# Patient Record
Sex: FEMALE | Race: WHITE | NOT HISPANIC OR LATINO | Employment: OTHER | URBAN - NONMETROPOLITAN AREA
[De-identification: names, ages, dates, MRNs, and addresses within clinical notes are randomized per-mention and may not be internally consistent; named-entity substitution may affect disease eponyms.]

---

## 2020-07-13 ENCOUNTER — OFFICE VISIT (OUTPATIENT)
Dept: OTOLARYNGOLOGY | Facility: CLINIC | Age: 85
End: 2020-07-13

## 2020-07-13 ENCOUNTER — PROCEDURE VISIT (OUTPATIENT)
Dept: OTOLARYNGOLOGY | Facility: CLINIC | Age: 85
End: 2020-07-13

## 2020-07-13 VITALS
HEIGHT: 63 IN | DIASTOLIC BLOOD PRESSURE: 78 MMHG | WEIGHT: 179.6 LBS | HEART RATE: 110 BPM | BODY MASS INDEX: 31.82 KG/M2 | TEMPERATURE: 97.2 F | SYSTOLIC BLOOD PRESSURE: 135 MMHG

## 2020-07-13 DIAGNOSIS — H61.23 IMPACTED CERUMEN, BILATERAL: Primary | ICD-10-CM

## 2020-07-13 DIAGNOSIS — H90.3 SENSORINEURAL HEARING LOSS (SNHL) OF BOTH EARS: Primary | ICD-10-CM

## 2020-07-13 DIAGNOSIS — H90.3 SENSORINEURAL HEARING LOSS (SNHL) OF BOTH EARS: ICD-10-CM

## 2020-07-13 DIAGNOSIS — H61.20 IMPACTED CERUMEN, UNSPECIFIED LATERALITY: ICD-10-CM

## 2020-07-13 PROCEDURE — 69210 REMOVE IMPACTED EAR WAX UNI: CPT | Performed by: OTOLARYNGOLOGY

## 2020-07-13 PROCEDURE — 99202 OFFICE O/P NEW SF 15 MIN: CPT | Performed by: OTOLARYNGOLOGY

## 2020-07-13 RX ORDER — MAGNESIUM 200 MG
1000 TABLET ORAL
COMMUNITY

## 2020-07-13 RX ORDER — OMEPRAZOLE 40 MG/1
40 CAPSULE, DELAYED RELEASE ORAL
COMMUNITY
Start: 2020-07-07 | End: 2021-07-08

## 2020-07-13 RX ORDER — SODIUM PHOSPHATE,MONO-DIBASIC 19G-7G/118
2 ENEMA (ML) RECTAL DAILY
COMMUNITY

## 2020-07-13 RX ORDER — METOCLOPRAMIDE 10 MG/1
TABLET ORAL
COMMUNITY
Start: 2020-07-07 | End: 2022-02-10

## 2020-07-13 RX ORDER — METOPROLOL SUCCINATE 25 MG/1
12.5 TABLET, EXTENDED RELEASE ORAL DAILY
COMMUNITY
Start: 2020-05-15

## 2020-07-13 RX ORDER — LATANOPROST 50 UG/ML
SOLUTION/ DROPS OPHTHALMIC
COMMUNITY
Start: 2020-07-07

## 2020-07-13 RX ORDER — LISINOPRIL 10 MG/1
10 TABLET ORAL DAILY
COMMUNITY
Start: 2020-07-07

## 2020-07-13 RX ORDER — ASPIRIN 81 MG/1
81 TABLET ORAL DAILY
COMMUNITY

## 2020-07-13 RX ORDER — FAMOTIDINE 20 MG/1
40 TABLET, FILM COATED ORAL 2 TIMES DAILY
COMMUNITY
Start: 2020-06-08 | End: 2022-02-10

## 2020-07-13 RX ORDER — GABAPENTIN 600 MG/1
TABLET ORAL
COMMUNITY
Start: 2020-06-22

## 2020-07-13 RX ORDER — ATORVASTATIN CALCIUM 10 MG/1
10 TABLET, FILM COATED ORAL DAILY
COMMUNITY
Start: 2020-06-08

## 2020-07-13 RX ORDER — ASCORBIC ACID 250 MG
500 TABLET ORAL DAILY
COMMUNITY

## 2020-07-13 NOTE — PROGRESS NOTES
Ted Mantilla MD     Chief Complaint   Patient presents with   • Ear Problem     cerumen impacted both ears, trouble hearing in LEFT ears         HPI   I have reviewed the history as documented by the MA/PRECIOUS/RN Ted Mantilla MD  Pat Dey is a 85 y.o. female presents for decreased hearing and fullness on the left side.  She feels it is wax.  She has not had ear drainage or dizziness.    Review of Systems   I have reviewed the ROS as documented by the MA/PRECIOUS/RN Ted Mantilla MD      Past History:  Past Medical History:   Diagnosis Date   • Diabetes (CMS/HCC)    • GERD (gastroesophageal reflux disease)    • High blood pressure      Past Surgical History:   Procedure Laterality Date   • APPENDECTOMY     • CHOLECYSTECTOMY     • CYST REMOVAL     • FOOT SURGERY     • HYSTERECTOMY     • TUBAL ABDOMINAL LIGATION       Family History   Problem Relation Age of Onset   • Cancer Mother    • Aneurysm Mother    • Tuberculosis Father    • Diabetes Maternal Grandmother      Social History     Tobacco Use   • Smoking status: Former Smoker     Last attempt to quit: 1990     Years since quittin.5   • Smokeless tobacco: Never Used   Substance Use Topics   • Alcohol use: Never     Frequency: Never   • Drug use: Never     Outpatient Medications Marked as Taking for the 20 encounter (Office Visit) with Ted Mantilla MD   Medication Sig Dispense Refill   • ascorbic acid (VITAMIN C) 250 MG tablet Take 500 mg by mouth Daily.     • aspirin (ASPIR) 81 MG EC tablet Take 81 mg by mouth Daily.     • atorvastatin (LIPITOR) 10 MG tablet Take 10 mg by mouth Daily.     • Cyanocobalamin (VITAMIN B-12) 1000 MCG sublingual tablet Take 1,000 mcg by mouth.     • famotidine (PEPCID) 20 MG tablet Take 40 mg by mouth 2 (Two) Times a Day.     • gabapentin (NEURONTIN) 600 MG tablet      • glucosamine-chondroitin 500-400 MG capsule capsule Take 2 tablets by mouth Daily.     • latanoprost (XALATAN) 0.005 % ophthalmic  solution      • lisinopril (PRINIVIL,ZESTRIL) 10 MG tablet Take 10 mg by mouth Daily.     • metFORMIN (GLUCOPHAGE) 500 MG tablet      • metoclopramide (REGLAN) 10 MG tablet      • metoprolol succinate XL (TOPROL-XL) 25 MG 24 hr tablet Take 12.5 mg by mouth Daily.     • omeprazole (priLOSEC) 40 MG capsule Take 40 mg by mouth.     • vitamin d (CHOLECALIFEROL) 125 MCG (5000 UT) capsule Take 5,000 Units by mouth Daily.       Allergies:  Celecoxib; Pioglitazone; Betaxolol; Cephalexin; Codeine; Hydrocortisone; Ibuprofen; Indomethacin; Lidocaine hcl; Metronidazole; Other; Phenylbutazone; Prednisolone; Rofecoxib; Sitagliptin; Sulfamethoxazole-trimethoprim; and Timolol maleate      Vital Signs:   Temp:  [97.2 °F (36.2 °C)] 97.2 °F (36.2 °C)  Heart Rate:  [110] 110  BP: (135)/(78) 135/78    Physical Exam  CONSTITUTIONAL: well nourished, well-developed, alert, oriented, in no acute distress   COMMUNICATION AND VOICE: able to communicate normally, normal voice quality  HEAD: normocephalic, no lesions, atraumatic, no tenderness, no masses   FACE: appearance normal, no lesions, no tenderness, no deformities, facial motion symmetric  EYES: ocular motility normal, eyelids normal, orbits normal, no proptosis, conjunctiva normal , pupils equal, round  HEARING: response to conversational voice normal bilaterally   EXTERNAL EARS: auricles without lesions  EXTERNAL EAR CANAL: There is a left-sided cerumen impaction that was removed.  Right side had minimal wax that was also removed.  TYMPANIC MEMBRANES: tympanic membrane appearance normal, no lesions, no perforation, normal mobility, no fluid  EXTERNAL NOSE: structure normal, no tenderness on palpation, no nasal discharge, no lesions, no evidence of trauma, nostrils patent  LIPS: structure normal, no tenderness on palpation, no lesions, no evidence of trauma  NECK: neck appearance normal  LYMPH NODES: no lymphadenopathy  CHEST/RESPIRATORY: respiratory effort normal  CARDIOVASCULAR:  extremities without cyanosis or edema, no overt jugulovenous distension present  NEUROLOGIC/PSYCHIATRIC: oriented appropriately for age, mood normal, affect appropriate, cranial nerves intact grossly unless specifically mentioned above     RESULTS REVIEW:    Audiologic testing reviewed.       Assessment/Plan    Assessment:    1. Impacted cerumen, bilateral    2. Sensorineural hearing loss (SNHL) of both ears        Plan:       Consider hearing aid amplification.        Orders Placed This Encounter   Procedures   • Ear Cerumen Removal       Return if symptoms worsen or fail to improve.       Ted Mantilla MD  07/13/20  15:25

## 2020-07-13 NOTE — PROGRESS NOTES
Procedure   Ear Cerumen Removal  Date/Time: 7/13/2020 3:23 PM  Performed by: Ted Mantilla MD  Authorized by: Ted Mantilla MD   Consent: Verbal consent obtained.  Patient understanding: patient states understanding of the procedure being performed    Anesthesia:  Local Anesthetic: none  Location details: left ear and right ear  Patient tolerance: Patient tolerated the procedure well with no immediate complications  Procedure type: instrumentation (Curette)

## 2020-07-13 NOTE — PROGRESS NOTES
Geneva Caldwell RN   Patient Intake Note    History:  Main Complaints: ear fullness  Location: bilateral  Severity: moderate   Frequency: increasing in amount  Duration: for the last several months   Aggravating factors: There have been no identified factors that aggravate the symptoms.   Alleviating factors: no identifiable factors    Review of Systems  Review of Systems   Constitutional: Negative for chills and fever.   HENT: Negative for ear discharge and ear pain.    Respiratory: Negative for cough, choking and shortness of breath.    Gastrointestinal: Negative for diarrhea, nausea and vomiting.   Musculoskeletal: Negative for neck pain and neck stiffness.   Neurological: Negative for dizziness, light-headedness and headaches.   Hematological: Does not bruise/bleed easily.   Psychiatric/Behavioral: Negative for sleep disturbance.       Tobacco Use: Screening and Cessation Intervention  Social History    Tobacco Use      Smoking status: Former Smoker        Quit date:         Years since quittin.5      Smokeless tobacco: Never Used        Geneva Caldwell RN  2020  15:08  History:

## 2022-01-12 RX ORDER — HYDROCODONE BITARTRATE AND ACETAMINOPHEN 5; 325 MG/1; MG/1
1 TABLET ORAL EVERY 6 HOURS PRN
COMMUNITY

## 2022-01-12 RX ORDER — LISINOPRIL 10 MG/1
10 TABLET ORAL DAILY
COMMUNITY

## 2022-01-12 RX ORDER — LATANOPROST 50 UG/ML
1 SOLUTION/ DROPS OPHTHALMIC NIGHTLY
COMMUNITY

## 2022-01-12 RX ORDER — MULTIVIT WITH MINERALS/LUTEIN
250 TABLET ORAL DAILY
COMMUNITY

## 2022-01-12 RX ORDER — INSULIN DETEMIR 100 [IU]/ML
15 INJECTION, SOLUTION SUBCUTANEOUS NIGHTLY
COMMUNITY
End: 2022-04-06 | Stop reason: ALTCHOICE

## 2022-01-12 RX ORDER — GABAPENTIN 600 MG/1
600 TABLET ORAL 3 TIMES DAILY
COMMUNITY

## 2022-01-12 RX ORDER — METOPROLOL SUCCINATE 25 MG/1
25 TABLET, EXTENDED RELEASE ORAL DAILY
COMMUNITY

## 2022-01-12 RX ORDER — DIPHENOXYLATE HYDROCHLORIDE AND ATROPINE SULFATE 2.5; .025 MG/1; MG/1
1 TABLET ORAL 4 TIMES DAILY PRN
COMMUNITY
End: 2022-03-10

## 2022-01-12 RX ORDER — ONDANSETRON 4 MG/1
4 TABLET, ORALLY DISINTEGRATING ORAL EVERY 8 HOURS PRN
COMMUNITY
End: 2022-04-06 | Stop reason: ALTCHOICE

## 2022-01-12 RX ORDER — ASPIRIN 81 MG/1
81 TABLET ORAL DAILY
COMMUNITY

## 2022-01-12 RX ORDER — DOXYCYCLINE 100 MG/1
100 CAPSULE ORAL 2 TIMES DAILY
COMMUNITY
End: 2022-01-21 | Stop reason: ALTCHOICE

## 2022-01-12 RX ORDER — MULTIVITAMIN WITH IRON
100 TABLET ORAL DAILY
COMMUNITY

## 2022-01-12 RX ORDER — LANOLIN ALCOHOL/MO/W.PET/CERES
1000 CREAM (GRAM) TOPICAL DAILY
COMMUNITY
End: 2022-01-21 | Stop reason: ALTCHOICE

## 2022-01-12 RX ORDER — FUROSEMIDE 20 MG/1
20 TABLET ORAL DAILY
COMMUNITY
End: 2023-09-22

## 2022-01-12 RX ORDER — NALOXONE HYDROCHLORIDE 4 MG/.1ML
1 SPRAY NASAL PRN
COMMUNITY
End: 2022-01-21 | Stop reason: ALTCHOICE

## 2022-01-21 ENCOUNTER — TELEPHONE (OUTPATIENT)
Dept: GASTROENTEROLOGY | Age: 87
End: 2022-01-21

## 2022-01-21 ENCOUNTER — HOSPITAL ENCOUNTER (OUTPATIENT)
Dept: GENERAL RADIOLOGY | Age: 87
Discharge: HOME | End: 2022-01-21
Payer: MEDICARE

## 2022-01-21 ENCOUNTER — OFFICE VISIT (OUTPATIENT)
Dept: GASTROENTEROLOGY | Age: 87
End: 2022-01-21
Payer: MEDICARE

## 2022-01-21 VITALS
DIASTOLIC BLOOD PRESSURE: 76 MMHG | HEIGHT: 63 IN | WEIGHT: 172.4 LBS | HEART RATE: 78 BPM | BODY MASS INDEX: 30.55 KG/M2 | SYSTOLIC BLOOD PRESSURE: 128 MMHG | OXYGEN SATURATION: 98 %

## 2022-01-21 DIAGNOSIS — Z87.19 HISTORY OF CHRONIC CONSTIPATION: ICD-10-CM

## 2022-01-21 DIAGNOSIS — R10.30 ABDOMINAL PAIN, LOWER: Primary | ICD-10-CM

## 2022-01-21 DIAGNOSIS — R19.7 DIARRHEA, UNSPECIFIED TYPE: ICD-10-CM

## 2022-01-21 DIAGNOSIS — R11.0 NAUSEA: ICD-10-CM

## 2022-01-21 DIAGNOSIS — R10.30 ABDOMINAL PAIN, LOWER: ICD-10-CM

## 2022-01-21 DIAGNOSIS — R14.0 ABDOMINAL BLOATING: ICD-10-CM

## 2022-01-21 DIAGNOSIS — Z11.59 SCREENING FOR VIRAL DISEASE: Primary | ICD-10-CM

## 2022-01-21 PROCEDURE — 1036F TOBACCO NON-USER: CPT | Performed by: NURSE PRACTITIONER

## 2022-01-21 PROCEDURE — 99203 OFFICE O/P NEW LOW 30 MIN: CPT | Performed by: NURSE PRACTITIONER

## 2022-01-21 PROCEDURE — G8427 DOCREV CUR MEDS BY ELIG CLIN: HCPCS | Performed by: NURSE PRACTITIONER

## 2022-01-21 PROCEDURE — G8484 FLU IMMUNIZE NO ADMIN: HCPCS | Performed by: NURSE PRACTITIONER

## 2022-01-21 PROCEDURE — G8419 CALC BMI OUT NRM PARAM NOF/U: HCPCS | Performed by: NURSE PRACTITIONER

## 2022-01-21 PROCEDURE — 4040F PNEUMOC VAC/ADMIN/RCVD: CPT | Performed by: NURSE PRACTITIONER

## 2022-01-21 PROCEDURE — 74018 RADEX ABDOMEN 1 VIEW: CPT

## 2022-01-21 PROCEDURE — 1090F PRES/ABSN URINE INCON ASSESS: CPT | Performed by: NURSE PRACTITIONER

## 2022-01-21 PROCEDURE — 1123F ACP DISCUSS/DSCN MKR DOCD: CPT | Performed by: NURSE PRACTITIONER

## 2022-01-21 RX ORDER — ATORVASTATIN CALCIUM 10 MG/1
10 TABLET, FILM COATED ORAL DAILY
COMMUNITY

## 2022-01-21 RX ORDER — FAMOTIDINE 40 MG/1
40 TABLET, FILM COATED ORAL 2 TIMES DAILY
COMMUNITY

## 2022-01-21 ASSESSMENT — ENCOUNTER SYMPTOMS
BLOOD IN STOOL: 0
VOMITING: 0
VOICE CHANGE: 0
RECTAL PAIN: 0
NAUSEA: 1
SHORTNESS OF BREATH: 0
TROUBLE SWALLOWING: 0
ANAL BLEEDING: 0
BACK PAIN: 0
SORE THROAT: 0
COUGH: 0
ABDOMINAL PAIN: 1
DIARRHEA: 1
ABDOMINAL DISTENTION: 0
CONSTIPATION: 1

## 2022-01-21 NOTE — PROGRESS NOTES
Subjective:      Zenaida Poon is a80 y.o. female  Chief Complaint   Patient presents with    New Patient    Diarrhea    Nausea       HPI  PCP: Mery Chase MD  Referring Provider: Lee Leggett MD  New pt referral  From PCP  Pt has the following GI complaints  Reports she had chronic constipation  For 50 years  Worsening in Nov 2021. Wasn't able to have a BM at all. Had to digitally disimpact herself. But still couldn't get the stool out. So she took 2 bottle of mag citrate. This caused diarrhea. Went to the ED because the diarrhea wouldn't stop and she was having lower abd pain and RB with wiping with this. CT 11/2021 from referring provider noted fecal impaction in rectum with edema and proctitis. Reports the ED staff digitally removed the fecal impaction. She continued to have diarrhea  And so her PCP prescribed imodium for this. The imodium didn't work. So he prescribed Lomotil. She has been taking the lomitil 4x daily since this time. Reports she is having 3 diarrhea stools daily even on the lomotil. She is also on magnesium BID, she reports this is for immune health. Has constant lower abd pain and cramping. And bloating and gas and nausea. Reports she has a hx of h pylori many years ago. Stools are nonbloody. Family HX:  Brother had colon cancer, paternal uncle had colon cancer  Pt denies family hx of  inflammatory bowel dx, gastric CA and esophageal CA.     Past Medical History:   Diagnosis Date    Chronic pain     Diabetes ([address]     GERD (gastroesophageal reflux disease)     Hypertension           Past Surgical History:   Procedure Laterality Date    APPENDECTOMY      CHOLECYSTECTOMY      COLONOSCOPY  2012    Polyps & diverticulosis in sig colon  per patient     HYSTERECTOMY      TUBAL LIGATION         Social History     Socioeconomic History    Marital status: Unknown     Spouse name: None    Number of children: None    Years of education: None    Highest education level: None   Occupational History    None   Tobacco Use    Smoking status: Former Smoker     Quit date: 1987     Years since quittin.0    Smokeless tobacco: Never Used   Vaping Use    Vaping Use: Never used   Substance and Sexual Activity    Alcohol use: Not Currently    Drug use: Not Currently    Sexual activity: Not Currently   Other Topics Concern    None   Social History Narrative    None     Social Determinants of Health     Financial Resource Strain:     Difficulty of Paying Living Expenses: Not on file   Food Insecurity:     Worried About Running Out of Food in the Last Year: Not on file    Isidro of Food in the Last Year: Not on file   Transportation Needs:     Lack of Transportation (Medical): Not on file    Lack of Transportation (Non-Medical):  Not on file   Physical Activity:     Days of Exercise per Week: Not on file    Minutes of Exercise per Session: Not on file   Stress:     Feeling of Stress : Not on file   Social Connections:     Frequency of Communication with Friends and Family: Not on file    Frequency of Social Gatherings with Friends and Family: Not on file    Attends Confucianism Services: Not on file    Active Member of [address] [place] [address] or Organizations: Not on file    Attends Club or Organization Meetings: Not on file    Marital Status: Not on file   Intimate Partner Violence:     Fear of Current or Ex-Partner: Not on file    Emotionally Abused: Not on file    Physically Abused: Not on file    Sexually Abused: Not on file   Housing Stability:     Unable to Pay for Housing in the Last Year: Not on file    Number of [place] in the Last Year: Not on file    Unstable Housing in the Last Year: Not on file       Allergies   Allergen Reactions    Actos [Pioglitazone] Swelling    Augmentin [Amoxicillin-Pot Clavulanate]     Beta Adrenergic Blockers     Betoptic [Betaxolol] Other (See Comments)     WEAKNESS    Celebrex [Celecoxib] Swelling    Codeine  Cortisone     Farxiga [Dapagliflozin] Itching     VAGINAL ITCHING    Flagyl [Metronidazole]     Ibuprofen     Indocin [Indomethacin] Other (See Comments)     WEAKNESS    Januvia [Sitagliptin] Other (See Comments)     SPRES IN MOUTH    Jardiance [Empagliflozin] Itching     VAGINAL ITCHING    Keflex [Cephalexin]     Lidocaine Other (See Comments)     Mouth ulcers    Other      BATA BLOCKERS    Phenylbutazones Other (See Comments)     WEAKNESS    Prednisolone Other (See Comments)     WEAKNESS    Semaglutide Other (See Comments)     RYBELSUS--abd pain    Sulfa Antibiotics     Timoptic [Timolol] Other (See Comments)     WEAKNESS    Vioxx [Rofecoxib] Other (See Comments)     WEAKNESS    Bactrim [Sulfamethoxazole-Trimethoprim] Rash    Cortizone-10 [Hydrocortisone] Rash    Phenylbutazone Other (See Comments)     weakness  weakness         Current Outpatient Medications   Medication Sig Dispense Refill    atorvastatin (LIPITOR) 10 MG tablet Take 10 mg by mouth daily      famotidine (PEPCID) 40 MG tablet Take 40 mg by mouth 2 times daily      Ascorbic Acid (VITAMIN C) 250 MG tablet Take 250 mg by mouth daily      aspirin 81 MG EC tablet Take 81 mg by mouth daily      calcium carbonate-vitamin D (CALTRATE) 600-400 MG-UNIT TABS per tab Take 1 tablet by mouth 2 times daily      Cholecalciferol (VITAMIN D3) 125 MCG (5000 UT) CAPS Take 5,000 Units by mouth daily      diphenoxylate-atropine (LOMOTIL) 2.5-0.025 MG per tablet Take 1 tablet by mouth 4 times daily as needed for Diarrhea.  furosemide (LASIX) 20 MG tablet Take 20 mg by mouth daily      gabapentin (NEURONTIN) 600 MG tablet Take 600 mg by mouth 3 times daily.  HYDROcodone-acetaminophen (NORCO) 5-325 MG per tablet Take 1 tablet by mouth every 6 hours as needed for Pain.       insulin detemir (LEVEMIR FLEXTOUCH) 100 UNIT/ML injection pen Inject 15 Units into the skin nightly       latanoprost (XALATAN) 0.005 % ophthalmic solution Thyroid: No thyromegaly. Cardiovascular:      Rate and Rhythm: Normal rate and regular rhythm. Heart sounds: Normal heart sounds. No murmur heard. No friction rub. No gallop. Pulmonary:      Effort: Pulmonary effort is normal. No respiratory distress. Breath sounds: Normal breath sounds. Abdominal:      General: Bowel sounds are normal. There is no distension. Palpations: Abdomen is soft. Tenderness: There is abdominal tenderness (lower abd pain with palpation). There is no rebound. Musculoskeletal:         General: No deformity. Normal range of motion. Cervical back: Normal range of motion and neck supple. Neurological:      Mental Status: She is alert and oriented to person, place, and time. Cranial Nerves: No cranial nerve deficit. Psychiatric:         Judgment: Judgment normal.           Assessment:       Diagnosis Orders   1. Abdominal pain, lower  XR ABDOMEN (KUB) (SINGLE AP VIEW)    COLONOSCOPY W/ OR W/O BIOPSY   2. Diarrhea, unspecified type  COLONOSCOPY W/ OR W/O BIOPSY   3. History of chronic constipation  COLONOSCOPY W/ OR W/O BIOPSY   4. Nausea  ESOPHAGOSCOPY / EGD   5. Abdominal bloating  ESOPHAGOSCOPY / EGD         Plan:      1. KUB today to r/o constipation with overflow. 2. Sent pt home with diatherix stool test to r/o infectious etiology  3. Schedule outpatient endoscopy r/o h pylori. Patient advised no Aspirin, Fish Oil, Vit E or NSAIDs 5 (five) days before procedure. Follow-up Visit: per Dr. Nataliia Slaughter  Pt Education:   Risks, benefits, and alternatives to endoscopy were discussed. Patient voices understanding of risks of, but not limited to, perforation, bleeding, and infection. The risk of perforation is increased with esophageal dilatation. All questions answered to patient's satisfaction. Patient is agreable to proceed. 4. Schedule outpatient colonoscopy with random colon bx.   Patient advised no Aspirin, Fish Oil, Vit E or NSAIDs 5 (five) days before procedure. Follow-up Visit: per Dr Vipul Lu   Pt education:  Risks, benefits, and alternatives to colonoscopy were discussed. Risks of colonoscopy include, but are not limited to, perforation, bleeding, and infection. We discussed that the risk for perforation is 1-3 in 5,000  at the time of colonoscopy;   and 1-2% risk of bleeding post-polypectomy. All questions answered to the satisfaction of the patient.  Pt is agreeable to proceed.'

## 2022-01-24 ENCOUNTER — TELEPHONE (OUTPATIENT)
Dept: GASTROENTEROLOGY | Age: 87
End: 2022-01-24

## 2022-01-24 NOTE — TELEPHONE ENCOUNTER
Assessment:        Diagnosis Orders   1. Abdominal pain, lower  XR ABDOMEN (KUB) (SINGLE AP VIEW)     COLONOSCOPY W/ OR W/O BIOPSY   2. Diarrhea, unspecified type  COLONOSCOPY W/ OR W/O BIOPSY   3. History of chronic constipation  COLONOSCOPY W/ OR W/O BIOPSY   4. Nausea  ESOPHAGOSCOPY / EGD   5. Abdominal bloating  ESOPHAGOSCOPY / EGD                    Plan:      1. KUB today to r/o constipation with overflow. 2. Sent pt home with diatherix stool test to r/o infectious etiology  3. Schedule outpatient endoscopy r/o h pylori. Patient advised no Aspirin, Fish Oil, Vit E or NSAIDs 5 (five) days before procedure. Follow-up Visit: per Dr. Chetna Rosas  Pt Education:   Risks, benefits, and alternatives to endoscopy were discussed. Patient voices understanding of risks of, but not limited to, perforation, bleeding, and infection. The risk of perforation is increased with esophageal dilatation. All questions answered to patient's satisfaction. Patient is agreable to proceed. 4. Schedule outpatient colonoscopy with random colon bx. Last visit [facility] aprn 1-21-22, see above. Egd/Cln scheduled 1-27-22. No FU scheduled as to date. Patient called today from [phone] said she is to collect stool on this Diatherix Stool kit she was sent home with and some information on the papers with the stool test said not to put watery diarrhea in the tube, needs to be in a container. I checked with CV cma and she said for the patient to move forward with the Diatherix stool testing, to simply use the long Q-tip to absorb some of the watery stool and place the Q-tip in the small plastic tube, break off the end of the Q-tip and then screw the lid on the tube, write her name and the date collected and return to us so we can mail it off for testing as a GI panel. I am not sure, never been asked this question before but I believe it means not to fill the tube with watery diarrhea, just the end of the Q-tip.  Patient voiced understanding and appreciation for the return call.   murali

## 2022-01-27 ENCOUNTER — TELEPHONE (OUTPATIENT)
Dept: GASTROENTEROLOGY | Age: 87
End: 2022-01-27

## 2022-01-27 ENCOUNTER — HOSPITAL ENCOUNTER (OUTPATIENT)
Age: 87
Setting detail: OUTPATIENT SURGERY
Discharge: HOME | End: 2022-01-27
Attending: INTERNAL MEDICINE | Admitting: INTERNAL MEDICINE
Payer: MEDICARE

## 2022-01-27 ENCOUNTER — ANESTHESIA EVENT (OUTPATIENT)
Dept: ENDOSCOPY | Age: 87
End: 2022-01-27
Payer: MEDICARE

## 2022-01-27 ENCOUNTER — ANESTHESIA (OUTPATIENT)
Dept: ENDOSCOPY | Age: 87
End: 2022-01-27
Payer: MEDICARE

## 2022-01-27 VITALS
DIASTOLIC BLOOD PRESSURE: 75 MMHG | TEMPERATURE: 97 F | HEIGHT: 63 IN | WEIGHT: 172 LBS | OXYGEN SATURATION: 99 % | HEART RATE: 81 BPM | SYSTOLIC BLOOD PRESSURE: 152 MMHG | BODY MASS INDEX: 30.48 KG/M2 | RESPIRATION RATE: 18 BRPM

## 2022-01-27 VITALS — TEMPERATURE: 97 F | DIASTOLIC BLOOD PRESSURE: 61 MMHG | SYSTOLIC BLOOD PRESSURE: 131 MMHG | OXYGEN SATURATION: 95 %

## 2022-01-27 DIAGNOSIS — R19.8 ABNORMAL FINDINGS ON ESOPHAGOGASTRODUODENOSCOPY (EGD): ICD-10-CM

## 2022-01-27 DIAGNOSIS — R14.0 ABDOMINAL BLOATING: Primary | ICD-10-CM

## 2022-01-27 DIAGNOSIS — R10.30 ABDOMINAL PAIN, LOWER: ICD-10-CM

## 2022-01-27 DIAGNOSIS — R11.0 NAUSEA: ICD-10-CM

## 2022-01-27 LAB
GLUCOSE BLD-MCNC: 153 MG/DL (ref 70–99)
PERFORMED ON: ABNORMAL

## 2022-01-27 PROCEDURE — 3609012400 HC EGD TRANSORAL BIOPSY SINGLE/MULTIPLE: Performed by: INTERNAL MEDICINE

## 2022-01-27 PROCEDURE — 45380 COLONOSCOPY AND BIOPSY: CPT | Performed by: INTERNAL MEDICINE

## 2022-01-27 PROCEDURE — 7100000011 HC PHASE II RECOVERY - ADDTL 15 MIN: Performed by: INTERNAL MEDICINE

## 2022-01-27 PROCEDURE — 2580000003 HC RX 258: Performed by: INTERNAL MEDICINE

## 2022-01-27 PROCEDURE — 3609010300 HC COLONOSCOPY W/BIOPSY SINGLE/MULTIPLE: Performed by: INTERNAL MEDICINE

## 2022-01-27 PROCEDURE — 3700000000 HC ANESTHESIA ATTENDED CARE: Performed by: INTERNAL MEDICINE

## 2022-01-27 PROCEDURE — 43239 EGD BIOPSY SINGLE/MULTIPLE: CPT | Performed by: INTERNAL MEDICINE

## 2022-01-27 PROCEDURE — 3700000001 HC ADD 15 MINUTES (ANESTHESIA): Performed by: INTERNAL MEDICINE

## 2022-01-27 PROCEDURE — 7100000010 HC PHASE II RECOVERY - FIRST 15 MIN: Performed by: INTERNAL MEDICINE

## 2022-01-27 PROCEDURE — 6360000002 HC RX W HCPCS

## 2022-01-27 PROCEDURE — 82947 ASSAY GLUCOSE BLOOD QUANT: CPT

## 2022-01-27 PROCEDURE — 88305 TISSUE EXAM BY PATHOLOGIST: CPT

## 2022-01-27 PROCEDURE — 2709999900 HC NON-CHARGEABLE SUPPLY: Performed by: INTERNAL MEDICINE

## 2022-01-27 RX ORDER — SODIUM CHLORIDE, SODIUM LACTATE, POTASSIUM CHLORIDE, CALCIUM CHLORIDE 600; 310; 30; 20 MG/100ML; MG/100ML; MG/100ML; MG/100ML
INJECTION, SOLUTION INTRAVENOUS CONTINUOUS
Status: DISCONTINUED | OUTPATIENT
Start: 2022-01-27 | End: 2022-01-27 | Stop reason: HOSPADM

## 2022-01-27 RX ORDER — PROPOFOL 10 MG/ML
INJECTION, EMULSION INTRAVENOUS PRN
Status: DISCONTINUED | OUTPATIENT
Start: 2022-01-27 | End: 2022-01-27 | Stop reason: SDUPTHER

## 2022-01-27 RX ADMIN — SODIUM CHLORIDE, POTASSIUM CHLORIDE, SODIUM LACTATE AND CALCIUM CHLORIDE: 600; 310; 30; 20 INJECTION, SOLUTION INTRAVENOUS at 09:12

## 2022-01-27 RX ADMIN — PROPOFOL 280 MG: 10 INJECTION, EMULSION INTRAVENOUS at 10:06

## 2022-01-27 ASSESSMENT — PAIN SCALES - GENERAL
PAINLEVEL_OUTOF10: 0
PAINLEVEL_OUTOF10: 0

## 2022-01-27 ASSESSMENT — LIFESTYLE VARIABLES: SMOKING_STATUS: 0

## 2022-01-27 ASSESSMENT — PAIN - FUNCTIONAL ASSESSMENT: PAIN_FUNCTIONAL_ASSESSMENT: 0-10

## 2022-01-27 NOTE — OP NOTE
Patient: Brissa Wilson : 1935  Med Rec#: [id number] Acc#: [id number]   Primary Care Provider Gabby Kwong MD    Date of Procedure:  2022    Endoscopist: Cortez Jackson MD, MD    Referring Provider: Gabby Kwong MD,     Operation Performed: Colonoscopy up to the cecum with random cold biopsies    Indications: For both EGD and colonoscopy exams today:    1. Abdominal pain, lower          2. Diarrhea, unspecified type     3. History of chronic constipation     4. Nausea     5. Abdominal bloating     6. Family HX:  Brother had colon cancer, paternal uncle had colon cancer    Anesthesia:  Sedation was administered by anesthesia who monitored the patient during the procedure. I met with Brissa Wilson prior to procedure. We discussed the procedure itself, and I have discussed the risks of endoscopy (including-- but not limited to-- pain, discomfort, bleeding potentially requiring second endoscopic procedure and/or blood transfusion, organ perforation requiring operative repair, damage to organs near the colon, infection, aspiration, cardiopulmonary/allergic reaction), benefits, indications to endoscopy. Additionally, we discussed options other than colonoscopy. The patient expressed understanding. All questions answered. The patient decided to proceed with the procedure. Signed informed consent was placed on the chart. Blood Loss: minimal    Withdrawal time: More than 6 minutes  Bowel Prep: Fair  with small amounts of thick solid and semisolid stool and moderate amount of thick, opaque liquid scattered in patchy segments throughout the colon obscuring the underlying mucosa. Lesions including polyps may have been missed. Complications: no immediate complications    DESCRIPTION OF PROCEDURE:     A time out was performed. After written informed consent was obtained, the patient was placed in the left lateral position.      The perianal area was inspected, and a digital rectal images was provided.     Dylon Dowd MD, MD  1/27/2022  9:18 AM

## 2022-01-27 NOTE — ANESTHESIA POSTPROCEDURE EVALUATION
Department of Anesthesiology  Postprocedure Note    Patient: Valentin Talbot  MRN: [id number]  YOB: 1935  Date of evaluation: 1/27/2022  Time:  10:32 AM     Procedure Summary     Date: 01/27/22 Room / Location: [address]    Anesthesia Start: 8524 Anesthesia Stop: 9087    Procedures:       EGD BIOPSY (N/A Abdomen)      COLONOSCOPY WITH BIOPSY (N/A ) Diagnosis: (DIARRHEA, CONSTIP, ABD PAIN, BLOATING, NAUSEA)    Surgeons: Ursula Bragg MD Responsible Provider: SUE Mar CRNA    Anesthesia Type: general, TIVA ASA Status: 3          Anesthesia Type: general, TIVA    Clarita Phase I: Clarita Score: 10    Clarita Phase II:      Last vitals: Reviewed and per EMR flowsheets.        Anesthesia Post Evaluation    Patient location during evaluation: bedside  Patient participation: waiting for patient participation  Level of consciousness: sleepy but conscious  Pain score: 0  Airway patency: patent  Nausea & Vomiting: no nausea and no vomiting  Complications: no  Cardiovascular status: hemodynamically stable and blood pressure returned to baseline  Respiratory status: acceptable and room air  Hydration status: stable

## 2022-01-27 NOTE — TELEPHONE ENCOUNTER
01-27-22 Per Dr Ayaz Vasquez,  Per Op Note     Consider nuclear medicine gastric emptying scan study if not done previously      Called patient   She did want to go ahead and get the Gastric Study done       Transferred to scheduling  02-04-22

## 2022-01-27 NOTE — H&P
Patient Name: Daniel Dang  : 1935  MRN: [id number]  DATE: 22    Allergies: Allergies   Allergen Reactions    Actos [Pioglitazone] Swelling    Augmentin [Amoxicillin-Pot Clavulanate]     Beta Adrenergic Blockers     Betoptic [Betaxolol] Other (See Comments)     WEAKNESS    Celebrex [Celecoxib] Swelling    Codeine     Cortisone     Farxiga [Dapagliflozin] Itching     VAGINAL ITCHING    Flagyl [Metronidazole]     Ibuprofen     Indocin [Indomethacin] Other (See Comments)     WEAKNESS    Januvia [Sitagliptin] Other (See Comments)     SPRES IN MOUTH    Jardiance [Empagliflozin] Itching     VAGINAL ITCHING    Keflex [Cephalexin]     Lidocaine Other (See Comments)     Mouth ulcers    Other      BATA BLOCKERS    Phenylbutazones Other (See Comments)     WEAKNESS    Prednisolone Other (See Comments)     WEAKNESS    Semaglutide Other (See Comments)     RYBELSUS--abd pain    Sulfa Antibiotics     Timoptic [Timolol] Other (See Comments)     WEAKNESS    Vioxx [Rofecoxib] Other (See Comments)     WEAKNESS    Bactrim [Sulfamethoxazole-Trimethoprim] Rash    Cortizone-10 [Hydrocortisone] Rash    Phenylbutazone Other (See Comments)     weakness  weakness          ENDOSCOPY  History and Physical    Procedure:    [x] Diagnostic Colonoscopy       [] Screening Colonoscopy  [x] EGD      [] ERCP      [] EUS       [] Other    [x] Previous office notes/History and Physical reviewed from the patients chart. Please see EMR for further details of HPI. I have examined the patient's status immediately prior to the procedure and:      Indications/HPI: For both EGD and colonoscopy exams today:    1. Abdominal pain, lower          2. Diarrhea, unspecified type     3. History of chronic constipation     4. Nausea     5. Abdominal bloating     6.  Family HX:  Brother had colon cancer, paternal uncle had colon cancer    []Abdominal Pain   []Cancer- GI/Lung     []Fhx of colon CA/polyps  []History of Polyps  []Barretts            []Melena  []Abnormal Imaging              []Dysphagia              []Persistent Pneumonia   []Anemia                            []Food Impaction        []History of Polyps  [] GI Bleed             []Pulmonary nodule/Mass   []Change in bowel habits []Heartburn/Reflux  []Rectal Bleed (BRBPR)  []Chest Pain - Non Cardiac []Heme (+) Stool []Ulcers  []Constipation  []Hemoptysis  []Varices  []Diarrhea  []Hypoxemia    []Nausea/Vomiting   []Screening   []Crohns/Colitis  []Other:     Anesthesia:   [x] MAC [] Moderate Sedation   [] General   [] None     ROS: 12 pt Review of Symptoms was negative unless mentioned above    Medications:   Prior to Admission medications    Medication Sig Start Date End Date Taking? Authorizing Provider   atorvastatin (LIPITOR) 10 MG tablet Take 10 mg by mouth daily    Historical Provider, MD   famotidine (PEPCID) 40 MG tablet Take 40 mg by mouth 2 times daily    Historical Provider, MD   Ascorbic Acid (VITAMIN C) 250 MG tablet Take 250 mg by mouth daily    Historical Provider, MD   aspirin 81 MG EC tablet Take 81 mg by mouth daily    Historical Provider, MD   calcium carbonate-vitamin D (CALTRATE) 600-400 MG-UNIT TABS per tab Take 1 tablet by mouth 2 times daily    Historical Provider, MD   Cholecalciferol (VITAMIN D3) 125 MCG (5000 UT) CAPS Take 5,000 Units by mouth daily    Historical Provider, MD   diphenoxylate-atropine (LOMOTIL) 2.5-0.025 MG per tablet Take 1 tablet by mouth 4 times daily as needed for Diarrhea. Historical Provider, MD   furosemide (LASIX) 20 MG tablet Take 20 mg by mouth daily    Historical Provider, MD   gabapentin (NEURONTIN) 600 MG tablet Take 600 mg by mouth 3 times daily. Historical Provider, MD   HYDROcodone-acetaminophen (NORCO) 5-325 MG per tablet Take 1 tablet by mouth every 6 hours as needed for Pain.     Historical Provider, MD   insulin detemir (LEVEMIR FLEXTOUCH) 100 UNIT/ML injection pen Inject 15 Units into the skin nightly Historical Provider, MD   latanoprost (XALATAN) 0.005 % ophthalmic solution Place 1 drop into both eyes nightly    Historical Provider, MD   lisinopril (PRINIVIL;ZESTRIL) 10 MG tablet Take 10 mg by mouth daily    Historical Provider, MD   Magnesium Oxide (MAGNESIUM-OXIDE) 250 MG TABS tablet Take 250 mg by mouth 2 times daily    Historical Provider, MD   metFORMIN (GLUCOPHAGE) 500 MG tablet Take 500 mg by mouth 2 times daily (with meals)    Historical Provider, MD   metoprolol succinate (TOPROL XL) 25 MG extended release tablet Take 25 mg by mouth daily HALF TABLET DAILY    Historical Provider, MD   ondansetron (ZOFRAN-ODT) 4 MG disintegrating tablet Take 4 mg by mouth every 8 hours as needed for Nausea or Vomiting    Historical Provider, MD   vitamin B-6 (PYRIDOXINE) 100 MG tablet Take 100 mg by mouth daily    Historical Provider, MD       Past Medical History:  Past Medical History:   Diagnosis Date    Chronic pain     Colon polyps     Coronary artery disease     Diabetes ([facility]     GERD (gastroesophageal reflux disease)     Glaucoma     Hyperlipidemia     Hypertension     Myocardial infarction ([facility]     Peripheral neuropathy        Past Surgical History:  Past Surgical History:   Procedure Laterality Date    APPENDECTOMY      BREAST LUMPECTOMY      CARDIAC CATHETERIZATION      CHOLECYSTECTOMY      COLONOSCOPY      Polyps & diverticulosis in sig colon  per patient     FOOT SURGERY      HYSTERECTOMY      NASAL SEPTUM SURGERY      TUBAL LIGATION         Social History:  Social History     Tobacco Use    Smoking status: Former Smoker     Quit date: 1987     Years since quittin.0    Smokeless tobacco: Never Used   Vaping Use    Vaping Use: Never used   Substance Use Topics    Alcohol use: Not Currently    Drug use: Not Currently       Vital Signs:   Vitals:    22 0859   BP: (!) 159/88   Pulse: 98   Resp: 20   Temp: 97.7 °F (36.5 °C)   SpO2: 100%        Physical Exam:  Cardiac:  [x]WNL  []Comments:  Pulmonary:  [x]WNL   []Comments:  Neuro/Mental Status:  [x]WNL  []Comments:  Abdominal:  [x]WNL    []Comments:  Other:   []WNL  []Comments:    Informed Consent:  The risks and benefits of the procedure have been discussed with either the patient or if they cannot consent, their representative. Assessment:  Patient examined and appropriate for planned sedation and procedure. Plan:  Proceed with planned sedation and procedure as above.          Staci Hnuter MD

## 2022-01-27 NOTE — OP NOTE
Endoscopic Procedure Note    Patient: Aba Isabel: 1935  [facility] Rec#: [id number] Acc#: [id number]     Primary Care Provider Yanira Leonard MD    Endoscopist: Dylon Dowd MD, MD    Date of Procedure:  1/27/2022    Procedure:   1. EGD with cold biopsies    Indications: For both EGD and colonoscopy exams today:    1. Abdominal pain, lower          2. Diarrhea, unspecified type     3. History of chronic constipation     4. Nausea     5. Abdominal bloating     6. Family HX:  Brother had colon cancer, paternal uncle had colon cancer    Anesthesia:  Sedation was administered by anesthesia who monitored the patient during the procedure. Estimated Blood Loss: minimal    Procedure:   After reviewing the patient's chart and obtaining informed consent, the patient was placed in the left lateral decubitus position. A forward-viewing Olympus endoscope was lubricated and inserted through the mouth into the oropharynx. Under direct visualization, the upper esophagus was intubated. The scope was advanced to the level of the third portion of duodenum. Scope was slowly withdrawn with careful inspection of the mucosal surfaces. The scope was retroflexed for inspection of the gastric fundus and incisura. Findings and maneuvers are listed in impression below. The patient tolerated the procedure well. The scope was removed. There were no immediate complications. Findings/IMPRESSION:  Esophagus: normal including EG junction at 40 cm. There is no hiatal hernia present. Stomach:  Normal.    NO ulcers or masses or gastric outlet obstruction or retained food or fluid. Rugae were normal and lumen distended well with insufflation. Retroflexed views otherwise revealed a normal GE junction, fundus and cardia as well. Duodenum: Normal. Random biopsies were taken to check for Celiac disease and other causes of villous atrophy. RECOMMENDATIONS:    1.   Await path results, the patient will be contacted in 7-10 days with biopsy results. 2.  Continue antiemetics as needed  3. Consider nuclear medicine gastric emptying scan study if not done previously  4. Follow instructions, diet and meds for control of her diabetes    The results were discussed with the patient and family. A copy of the images obtained were given to the patient.      Nicki Olea MD, MD  1/27/2022  9:19 AM

## 2022-01-27 NOTE — ANESTHESIA PRE PROCEDURE
Department of Anesthesiology  Preprocedure Note       Name:  Hugo Louis   Age:  80 y.o.  :  1935                                          MRN:  [id number]         Date:  2022      Surgeon: Lexus Hernandez):  Staci Hunter MD    Procedure: Procedure(s):  EGD BIOPSY  COLORECTAL CANCER SCREENING, NOT HIGH RISK    Medications prior to admission:   Prior to Admission medications    Medication Sig Start Date End Date Taking? Authorizing Provider   atorvastatin (LIPITOR) 10 MG tablet Take 10 mg by mouth daily    Historical Provider, MD   famotidine (PEPCID) 40 MG tablet Take 40 mg by mouth 2 times daily    Historical Provider, MD   Ascorbic Acid (VITAMIN C) 250 MG tablet Take 250 mg by mouth daily    Historical Provider, MD   aspirin 81 MG EC tablet Take 81 mg by mouth daily    Historical Provider, MD   calcium carbonate-vitamin D (CALTRATE) 600-400 MG-UNIT TABS per tab Take 1 tablet by mouth 2 times daily    Historical Provider, MD   Cholecalciferol (VITAMIN D3) 125 MCG (5000 UT) CAPS Take 5,000 Units by mouth daily    Historical Provider, MD   diphenoxylate-atropine (LOMOTIL) 2.5-0.025 MG per tablet Take 1 tablet by mouth 4 times daily as needed for Diarrhea. Historical Provider, MD   furosemide (LASIX) 20 MG tablet Take 20 mg by mouth daily    Historical Provider, MD   gabapentin (NEURONTIN) 600 MG tablet Take 600 mg by mouth 3 times daily. Historical Provider, MD   HYDROcodone-acetaminophen (NORCO) 5-325 MG per tablet Take 1 tablet by mouth every 6 hours as needed for Pain.     Historical Provider, MD   insulin detemir (LEVEMIR FLEXTOUCH) 100 UNIT/ML injection pen Inject 15 Units into the skin nightly     Historical Provider, MD   latanoprost (XALATAN) 0.005 % ophthalmic solution Place 1 drop into both eyes nightly    Historical Provider, MD   lisinopril (PRINIVIL;ZESTRIL) 10 MG tablet Take 10 mg by mouth daily    Historical Provider, MD   Magnesium Oxide (MAGNESIUM-OXIDE) 250 MG TABS tablet Take 250 mg by mouth 2 times daily    Historical Provider, MD   metFORMIN (GLUCOPHAGE) 500 MG tablet Take 500 mg by mouth 2 times daily (with meals)    Historical Provider, MD   metoprolol succinate (TOPROL XL) 25 MG extended release tablet Take 25 mg by mouth daily HALF TABLET DAILY    Historical Provider, MD   ondansetron (ZOFRAN-ODT) 4 MG disintegrating tablet Take 4 mg by mouth every 8 hours as needed for Nausea or Vomiting    Historical Provider, MD   vitamin B-6 (PYRIDOXINE) 100 MG tablet Take 100 mg by mouth daily    Historical Provider, MD       Current medications:    Current Facility-Administered Medications   Medication Dose Route Frequency Provider Last Rate Last Admin    lactated ringers infusion   IntraVENous Continuous Suzanne Narvaez  mL/hr at 01/27/22 0912 New Bag at 01/27/22 0912       Allergies:     Allergies   Allergen Reactions    Actos [Pioglitazone] Swelling    Augmentin [Amoxicillin-Pot Clavulanate]     Beta Adrenergic Blockers     Betoptic [Betaxolol] Other (See Comments)     WEAKNESS    Celebrex [Celecoxib] Swelling    Codeine     Cortisone     Farxiga [Dapagliflozin] Itching     VAGINAL ITCHING    Flagyl [Metronidazole]     Ibuprofen     Indocin [Indomethacin] Other (See Comments)     WEAKNESS    Januvia [Sitagliptin] Other (See Comments)     SPRES IN MOUTH    Jardiance [Empagliflozin] Itching     VAGINAL ITCHING    Keflex [Cephalexin]     Lidocaine Other (See Comments)     Mouth ulcers    Other      BATA BLOCKERS    Phenylbutazones Other (See Comments)     WEAKNESS    Prednisolone Other (See Comments)     WEAKNESS    Semaglutide Other (See Comments)     RYBELSUS--abd pain    Sulfa Antibiotics     Timoptic [Timolol] Other (See Comments)     WEAKNESS    Vioxx [Rofecoxib] Other (See Comments)     WEAKNESS    Bactrim [Sulfamethoxazole-Trimethoprim] Rash    Cortizone-10 [Hydrocortisone] Rash    Phenylbutazone Other (See Comments)     weakness  weakness Problem List:    Patient Active Problem List   Diagnosis Code    Abdominal pain, lower R10.30    Diarrhea R19.7    History of chronic constipation Z87.19    Nausea R11.0    Abdominal bloating R14.0       Past Medical History:        Diagnosis Date    Chronic pain     Colon polyps     Coronary artery disease     [facility]     GERD (gastroesophageal reflux disease)     Glaucoma     Hyperlipidemia     Hypertension     Myocardial infarction ([facility]     Peripheral neuropathy        Past Surgical History:        Procedure Laterality Date    APPENDECTOMY      BREAST LUMPECTOMY      CARDIAC CATHETERIZATION      CHOLECYSTECTOMY      COLONOSCOPY      Polyps & diverticulosis in sig colon  per patient     FOOT SURGERY      HYSTERECTOMY      NASAL SEPTUM SURGERY      TUBAL LIGATION         Social History:    Social History     Tobacco Use    Smoking status: Former Smoker     Quit date: 1987     Years since quittin.0    Smokeless tobacco: Never Used   Substance Use Topics    Alcohol use: Not Currently                                Counseling given: Not Answered      Vital Signs (Current):   Vitals:    22 0859   BP: (!) 159/88   Pulse: 98   Resp: 20   Temp: 97.7 °F (36.5 °C)   TempSrc: Temporal   SpO2: 100%   Weight: 172 lb (78 kg)   Height: 5' 3\" (1.6 m)                                              BP Readings from Last 3 Encounters:   22 (!) 159/88   22 128/76       NPO Status: Time of last liquid consumption: 0500                        Time of last solid consumption: 1800                        Date of last liquid consumption: 22                        Date of last solid food consumption: 22    BMI:   Wt Readings from Last 3 Encounters:   22 172 lb (78 kg)   22 172 lb 6.4 oz (78.2 kg)     Body mass index is 30.47 kg/m².     CBC: No results found for: WBC, RBC, HGB, HCT, MCV, RDW, PLT    CMP: No results found for: NA, K, CL, CO2, BUN, CREATININE, GFRAA, AGRATIO, LABGLOM, GLUCOSE, GLU, PROT, CALCIUM, BILITOT, ALKPHOS, AST, ALT    POC Tests:   Recent Labs     01/27/22  0911   POCGLU 153*       Coags: No results found for: PROTIME, INR, APTT    HCG (If Applicable): No results found for: PREGTESTUR, PREGSERUM, HCG, HCGQUANT     ABGs: No results found for: PHART, PO2ART, NSW0EDZ, TXF5MYR, BEART, I1AVLELR     Type & Screen (If Applicable):  No results found for: LABABO, LABRH    Drug/Infectious Status (If Applicable):  No results found for: HIV, HEPCAB    COVID-19 Screening (If Applicable): No results found for: COVID19        Anesthesia Evaluation  Patient summary reviewed no history of anesthetic complications:   Airway: Mallampati: II  TM distance: >3 FB   Neck ROM: full  Mouth opening: > = 3 FB Dental:    (+) edentulous  Comment: Upper edentulous  2 teeth on bottom    Pulmonary:       (-) not a current smoker (former smoker)                           Cardiovascular:    (+) hypertension: moderate, past MI: > 6 months, CAD:, hyperlipidemia        Rhythm: regular             Beta Blocker:  Dose within 24 Hrs         Neuro/Psych:   (+) neuromuscular disease:, TIA,             GI/Hepatic/Renal:   (+) GERD:, morbid obesity          Endo/Other:    (+) Diabetes, blood dyscrasia (Baby Aspirin, last dose 1/22/22)::., .                 Abdominal:   (+) obese,           Vascular: Other Findings:           Anesthesia Plan      general and TIVA     ASA 3       Induction: intravenous. Anesthetic plan and risks discussed with patient. Plan discussed with CRNA.                   SUE Quigley - GAIL   1/27/2022

## 2022-02-10 ENCOUNTER — OFFICE VISIT (OUTPATIENT)
Dept: OTOLARYNGOLOGY | Facility: CLINIC | Age: 87
End: 2022-02-10

## 2022-02-10 VITALS
BODY MASS INDEX: 31.01 KG/M2 | TEMPERATURE: 98.2 F | SYSTOLIC BLOOD PRESSURE: 122 MMHG | HEART RATE: 89 BPM | DIASTOLIC BLOOD PRESSURE: 81 MMHG | WEIGHT: 175 LBS | HEIGHT: 63 IN

## 2022-02-10 DIAGNOSIS — H61.23 BILATERAL IMPACTED CERUMEN: Primary | ICD-10-CM

## 2022-02-10 DIAGNOSIS — L29.9 EAR ITCHING: ICD-10-CM

## 2022-02-10 PROCEDURE — 69210 REMOVE IMPACTED EAR WAX UNI: CPT | Performed by: NURSE PRACTITIONER

## 2022-02-10 RX ORDER — PYRIDOXINE HCL (VITAMIN B6) 100 MG
100 TABLET ORAL
COMMUNITY

## 2022-02-10 RX ORDER — INSULIN DETEMIR 100 [IU]/ML
INJECTION, SOLUTION SUBCUTANEOUS
COMMUNITY
Start: 2021-08-17

## 2022-02-10 RX ORDER — MOMETASONE FUROATE 1 MG/ML
SOLUTION TOPICAL DAILY
Qty: 60 ML | Refills: 0 | Status: SHIPPED | OUTPATIENT
Start: 2022-02-10 | End: 2022-02-17

## 2022-02-10 RX ORDER — HYDROCODONE BITARTRATE AND ACETAMINOPHEN 5; 325 MG/1; MG/1
TABLET ORAL
COMMUNITY
End: 2023-03-14

## 2022-03-02 ENCOUNTER — HOSPITAL ENCOUNTER (OUTPATIENT)
Dept: NUCLEAR MEDICINE | Age: 87
Discharge: HOME | End: 2022-03-04
Payer: MEDICARE

## 2022-03-02 DIAGNOSIS — R14.0 ABDOMINAL BLOATING: ICD-10-CM

## 2022-03-02 DIAGNOSIS — R11.0 NAUSEA: ICD-10-CM

## 2022-03-02 DIAGNOSIS — R10.30 ABDOMINAL PAIN, LOWER: ICD-10-CM

## 2022-03-02 PROCEDURE — 3430000000 HC RX DIAGNOSTIC RADIOPHARMACEUTICAL: Performed by: INTERNAL MEDICINE

## 2022-03-02 PROCEDURE — A9541 TC99M SULFUR COLLOID: HCPCS | Performed by: INTERNAL MEDICINE

## 2022-03-02 PROCEDURE — 78264 GASTRIC EMPTYING IMG STUDY: CPT

## 2022-03-02 RX ADMIN — Medication 2 MILLICURIE: at 13:09

## 2022-03-10 ENCOUNTER — OFFICE VISIT (OUTPATIENT)
Dept: GASTROENTEROLOGY | Age: 87
End: 2022-03-10
Payer: MEDICARE

## 2022-03-10 VITALS
SYSTOLIC BLOOD PRESSURE: 139 MMHG | WEIGHT: 175 LBS | HEART RATE: 71 BPM | DIASTOLIC BLOOD PRESSURE: 70 MMHG | BODY MASS INDEX: 31.01 KG/M2 | OXYGEN SATURATION: 91 % | HEIGHT: 63 IN

## 2022-03-10 DIAGNOSIS — K59.09 CHRONIC CONSTIPATION: Primary | ICD-10-CM

## 2022-03-10 PROCEDURE — 1090F PRES/ABSN URINE INCON ASSESS: CPT | Performed by: NURSE PRACTITIONER

## 2022-03-10 PROCEDURE — 4040F PNEUMOC VAC/ADMIN/RCVD: CPT | Performed by: NURSE PRACTITIONER

## 2022-03-10 PROCEDURE — 1036F TOBACCO NON-USER: CPT | Performed by: NURSE PRACTITIONER

## 2022-03-10 PROCEDURE — G8484 FLU IMMUNIZE NO ADMIN: HCPCS | Performed by: NURSE PRACTITIONER

## 2022-03-10 PROCEDURE — G8417 CALC BMI ABV UP PARAM F/U: HCPCS | Performed by: NURSE PRACTITIONER

## 2022-03-10 PROCEDURE — 1123F ACP DISCUSS/DSCN MKR DOCD: CPT | Performed by: NURSE PRACTITIONER

## 2022-03-10 PROCEDURE — 99213 OFFICE O/P EST LOW 20 MIN: CPT | Performed by: NURSE PRACTITIONER

## 2022-03-10 PROCEDURE — G8427 DOCREV CUR MEDS BY ELIG CLIN: HCPCS | Performed by: NURSE PRACTITIONER

## 2022-03-10 ASSESSMENT — ENCOUNTER SYMPTOMS
ANAL BLEEDING: 0
RECTAL PAIN: 0
ABDOMINAL DISTENTION: 0
SHORTNESS OF BREATH: 0
DIARRHEA: 0
VOMITING: 0
VOICE CHANGE: 0
NAUSEA: 1
COUGH: 0
TROUBLE SWALLOWING: 0
BLOOD IN STOOL: 0
CONSTIPATION: 1
SORE THROAT: 0
ABDOMINAL PAIN: 1
BACK PAIN: 0

## 2022-03-10 NOTE — PATIENT INSTRUCTIONS
Patient Education        Constipation: Care Instructions  Overview     Constipation means that you have a hard time passing stools (bowel movements). People pass stools from 3 times a day to once every 3 days. What is normal for you may be different. Constipation may occur with pain in the rectum and cramping. The pain may get worse when you try to pass stools. Sometimes there are small amounts of bright red blood on toilet paper or the surface of stools. This is because of enlarged veins near the rectum (hemorrhoids). A few changes in your diet and lifestyle may help you avoid ongoing constipation. Your doctor may also prescribe medicine to help loosen your stool. Some medicines can cause constipation. These include pain medicines and antidepressants. Tell your doctor about all the medicines you take. Your doctor may want to make a medicine change to ease your symptoms. Follow-up care is a key part of your treatment and safety. Be sure to make and go to all appointments, and call your doctor if you are having problems. It's also a good idea to know your test results and keep a list of the medicines you take. How can you care for yourself at home? · Drink plenty of fluids. If you have kidney, heart, or liver disease and have to limit fluids, talk with your doctor before you increase the amount of fluids you drink. · Include high-fiber foods in your diet each day. These include fruits, vegetables, beans, and whole grains. · Get at least 30 minutes of exercise on most days of the week. Walking is a good choice. You also may want to do other activities, such as running, swimming, cycling, or playing tennis or team sports. · Take a fiber supplement, such as Citrucel or Metamucil, every day. Read and follow all instructions on the label. · Schedule time each day for a bowel movement. A daily routine may help. Take your time having a bowel movement, but don't sit for more than 10 minutes at a time.  And don't strain too much. · Support your feet with a small step stool when you sit on the toilet. This helps flex your hips and places your pelvis in a squatting position. · Your doctor may recommend an over-the-counter laxative to relieve your constipation. Examples are Milk of Magnesia and MiraLax. Read and follow all instructions on the label. Do not use laxatives on a long-term basis. When should you call for help? Call your doctor now or seek immediate medical care if:    · You have new or worse belly pain.     · You have new or worse nausea or vomiting.     · You have blood in your stools. Watch closely for changes in your health, and be sure to contact your doctor if:    · Your constipation is getting worse.     · You do not get better as expected. Where can you learn more? Go to https://Co[employer]iaelenaeb[employer]. org and sign in to your [employer] account. Enter 21 [phone] in the [employer] box to learn more about \"Constipation: Care Instructions. \"     If you do not have an account, please click on the \"Sign Up Now\" link. Current as of: July 1, 2021               Content Version: 13.1  © 7366-1726 [employer]. Care instructions adapted under license by [facility] ([facility]). If you have questions about a medical condition or this instruction, always ask your healthcare professional. [address] any warranty or liability for your use of this information.

## 2022-03-10 NOTE — PROGRESS NOTES
Subjective:      Leny Reyes is a80 y.o. female  Chief Complaint   Patient presents with    Follow-up       HPI  PCP: Marian Christiansen MD  Referring Provider: Dr Kan Sy MD  Pt made an appt s/p EGD/colonoscopy as advised by Dr Jossy Li  Denies post-procedural complications  Results of scopes in history, we reviewed everything  Previous to her scopes she c/o chronic constipation for 50 years, with diarrhea that started after she tried to get cleaned out with mag citrate. [facility] stool testing negative for infectious etiology  [facility] noted a moderate amt of stool in entire colon. Taking miralax daily  And has a BM almost daily but feels she still isnt emptying  Has diarrhea stool sometimes too  With cramping    Reports occasional nausea  With belching  Pepcid BID is on her med list  She reports she hasnt really taken that because she had a severe a/e of reglan (TD) so worried to take anything for UGI complaints     Family HX:  Brother had colon cancer, paternal uncle had colon cancer  Pt denies family hx of  inflammatory bowel dx, gastric CA and esophageal CA.     Past Medical History:   Diagnosis Date    Chronic pain     Colon polyps     Coronary artery disease     Diabetes (HCC)     GERD (gastroesophageal reflux disease)     Glaucoma     Hyperlipidemia     Hypertension     Myocardial infarction ([address]     Peripheral neuropathy           Past Surgical History:   Procedure Laterality Date    APPENDECTOMY      BREAST LUMPECTOMY      CARDIAC CATHETERIZATION      CHOLECYSTECTOMY      COLONOSCOPY  2012    Polyps & diverticulosis in sig colon  per patient     COLONOSCOPY N/A 01/27/2022    Dr Steffi Kimball mosley diverticulosis, Int hemorrhoids,  random colon bx negative, no recall due to age   [facility] [address]      TUBAL LIGATION      UPPER GASTROINTESTINAL ENDOSCOPY N/A 01/27/2022    Dr Denia Arteaga, [facility] celiac       Social History     Socioeconomic History    Marital status:      Spouse name: None    Number of children: None    Years of education: None    Highest education level: None   Occupational History    None   Tobacco Use    Smoking status: Former Smoker     Quit date: 1987     Years since quittin.1    Smokeless tobacco: Never Used   Vaping Use    Vaping Use: Never used   Substance and Sexual Activity    Alcohol use: Not Currently    Drug use: Not Currently    Sexual activity: Not Currently   Other Topics Concern    None   Social History Narrative    None     Social Determinants of Health     Financial Resource Strain:     Difficulty of Paying Living Expenses: Not on file   Food Insecurity:     Worried About Running Out of Food in the Last Year: Not on file    Isidro of Food in the Last Year: Not on file   Transportation Needs:     Lack of Transportation (Medical): Not on file    Lack of Transportation (Non-Medical):  Not on file   Physical Activity:     Days of Exercise per Week: Not on file    Minutes of Exercise per Session: Not on file   Stress:     Feeling of Stress : Not on file   Social Connections:     Frequency of Communication with Friends and Family: Not on file    Frequency of Social Gatherings with Friends and Family: Not on file    Attends Hinduism Services: Not on file    Active Member of [address] [place] [address] [employer] or Organizations: Not on file    Attends Club or Organization Meetings: Not on file    Marital Status: Not on file   Intimate Partner Violence:     Fear of Current or Ex-Partner: Not on file    Emotionally Abused: Not on file    Physically Abused: Not on file    Sexually Abused: Not on file   Housing Stability:     Unable to Pay for Housing in the Last Year: Not on file    Number of [place] in the Last Year: Not on file    Unstable Housing in the Last Year: Not on file       Allergies   Allergen Reactions    Actos [Pioglitazone] Swelling    Augmentin [Amoxicillin-Pot Clavulanate]     Beta Adrenergic Blockers     Betoptic [Betaxolol] Other (See Comments)     WEAKNESS    Celebrex [Celecoxib] Swelling    Codeine     Cortisone     Farxiga [Dapagliflozin] Itching     VAGINAL ITCHING    Flagyl [Metronidazole]     Ibuprofen     Indocin [Indomethacin] Other (See Comments)     WEAKNESS    Januvia [Sitagliptin] Other (See Comments)     SPRES IN MOUTH    Jardiance [Empagliflozin] Itching     VAGINAL ITCHING    Keflex [Cephalexin]     Lidocaine Other (See Comments)     Mouth ulcers    Other      BATA BLOCKERS    Phenylbutazones Other (See Comments)     WEAKNESS    Prednisolone Other (See Comments)     WEAKNESS    Reglan [Metoclopramide] Other (See Comments)     Tardine Dyskinesia    Semaglutide Other (See Comments)     RYBELSUS--abd pain    Sulfa Antibiotics     Timoptic [Timolol] Other (See Comments)     WEAKNESS    Vioxx [Rofecoxib] Other (See Comments)     WEAKNESS    Bactrim [Sulfamethoxazole-Trimethoprim] Rash    Cortizone-10 [Hydrocortisone] Rash    Phenylbutazone Other (See Comments)     weakness  weakness         Current Outpatient Medications   Medication Sig Dispense Refill    linaclotide (LINZESS) 145 MCG capsule Take 1 capsule by mouth every morning (before breakfast) 30 capsule 11    atorvastatin (LIPITOR) 10 MG tablet Take 10 mg by mouth daily      famotidine (PEPCID) 40 MG tablet Take 40 mg by mouth 2 times daily      Ascorbic Acid (VITAMIN C) 250 MG tablet Take 250 mg by mouth daily      aspirin 81 MG EC tablet Take 81 mg by mouth daily      calcium carbonate-vitamin D (CALTRATE) 600-400 MG-UNIT TABS per tab Take 1 tablet by mouth 2 times daily      Cholecalciferol (VITAMIN D3) 125 MCG (5000 UT) CAPS Take 5,000 Units by mouth daily      furosemide (LASIX) 20 MG tablet Take 20 mg by mouth daily      gabapentin (NEURONTIN) 600 MG tablet Take 600 mg by mouth 3 times daily.       HYDROcodone-acetaminophen (NORCO) 5-325 MG per tablet Take 1 tablet by mouth every 6 hours as needed for Pain.  insulin detemir (LEVEMIR FLEXTOUCH) 100 UNIT/ML injection pen Inject 15 Units into the skin nightly       latanoprost (XALATAN) 0.005 % ophthalmic solution Place 1 drop into both eyes nightly      lisinopril (PRINIVIL;ZESTRIL) 10 MG tablet Take 10 mg by mouth daily      Magnesium Oxide (MAGNESIUM-OXIDE) 250 MG TABS tablet Take 250 mg by mouth 2 times daily      metFORMIN (GLUCOPHAGE) 500 MG tablet Take 500 mg by mouth 2 times daily (with meals)      metoprolol succinate (TOPROL XL) 25 MG extended release tablet Take 25 mg by mouth daily HALF TABLET DAILY      ondansetron (ZOFRAN-ODT) 4 MG disintegrating tablet Take 4 mg by mouth every 8 hours as needed for Nausea or Vomiting      vitamin B-6 (PYRIDOXINE) 100 MG tablet Take 100 mg by mouth daily       No current facility-administered medications for this visit. Review of Systems   Constitutional: Negative for appetite change, fatigue, fever and unexpected weight change. HENT: Negative for sore throat, trouble swallowing and voice change. Respiratory: Negative for cough and shortness of breath. Cardiovascular: Negative for chest pain, palpitations and leg swelling. Gastrointestinal: Positive for abdominal pain (cramping), constipation and nausea (at times). Negative for abdominal distention, anal bleeding, blood in stool, diarrhea, rectal pain and vomiting. Genitourinary: Positive for frequency. Negative for hematuria. Musculoskeletal: Positive for arthralgias. Negative for back pain and neck pain. Neurological: Negative for dizziness, weakness, light-headedness and headaches. Psychiatric/Behavioral: Negative for dysphoric mood and sleep disturbance. The patient is not nervous/anxious. All other systems reviewed and are negative. Objective:     Physical Exam  Vitals and nursing note reviewed. Constitutional:       Appearance: She is well-developed. Comments: /70 (Site: Right Upper Arm)   Pulse 71   Ht 5' 3\" (1.6 m)   Wt 175 lb (79.4 kg)   SpO2 91%   BMI 31.00 kg/m²    Eyes:      General: No scleral icterus. Conjunctiva/sclera: Conjunctivae normal.      Pupils: Pupils are equal, round, and reactive to light. Neck:      Thyroid: No thyromegaly. Cardiovascular:      Rate and Rhythm: Normal rate and regular rhythm. Heart sounds: Normal heart sounds. No murmur heard. No friction rub. No gallop. Pulmonary:      Effort: Pulmonary effort is normal. No respiratory distress. Breath sounds: Normal breath sounds. Abdominal:      General: Bowel sounds are normal. There is no distension. Palpations: Abdomen is soft. Tenderness: There is no abdominal tenderness. There is no rebound. Musculoskeletal:         General: No deformity. Normal range of motion. Cervical back: Normal range of motion and neck supple. Neurological:      Mental Status: She is alert and oriented to person, place, and time. Cranial Nerves: No cranial nerve deficit. Psychiatric:         Judgment: Judgment normal.           Assessment:       Diagnosis Orders   1. Chronic constipation  linaclotide (LINZESS) 145 MCG capsule         Plan:      1. Start linzess 145mcg daily. OK to use miralax in addition to the Linzess if needed F/u in 3-4 weeks if not effective  2.  Start back on pepcid, no risk of TD with this medication

## 2022-04-06 ENCOUNTER — OFFICE VISIT (OUTPATIENT)
Dept: GASTROENTEROLOGY | Age: 87
End: 2022-04-06
Payer: MEDICARE

## 2022-04-06 VITALS
OXYGEN SATURATION: 97 % | DIASTOLIC BLOOD PRESSURE: 72 MMHG | SYSTOLIC BLOOD PRESSURE: 138 MMHG | BODY MASS INDEX: 31.93 KG/M2 | WEIGHT: 180.2 LBS | HEIGHT: 63 IN | HEART RATE: 77 BPM

## 2022-04-06 DIAGNOSIS — K59.09 CHRONIC CONSTIPATION: Primary | ICD-10-CM

## 2022-04-06 PROCEDURE — G8417 CALC BMI ABV UP PARAM F/U: HCPCS | Performed by: NURSE PRACTITIONER

## 2022-04-06 PROCEDURE — G8427 DOCREV CUR MEDS BY ELIG CLIN: HCPCS | Performed by: NURSE PRACTITIONER

## 2022-04-06 PROCEDURE — 1036F TOBACCO NON-USER: CPT | Performed by: NURSE PRACTITIONER

## 2022-04-06 PROCEDURE — 4040F PNEUMOC VAC/ADMIN/RCVD: CPT | Performed by: NURSE PRACTITIONER

## 2022-04-06 PROCEDURE — 99213 OFFICE O/P EST LOW 20 MIN: CPT | Performed by: NURSE PRACTITIONER

## 2022-04-06 PROCEDURE — 1123F ACP DISCUSS/DSCN MKR DOCD: CPT | Performed by: NURSE PRACTITIONER

## 2022-04-06 PROCEDURE — 1090F PRES/ABSN URINE INCON ASSESS: CPT | Performed by: NURSE PRACTITIONER

## 2022-04-06 RX ORDER — INSULIN DETEMIR 100 [IU]/ML
15 INJECTION, SOLUTION SUBCUTANEOUS NIGHTLY
COMMUNITY

## 2022-04-06 ASSESSMENT — ENCOUNTER SYMPTOMS
TROUBLE SWALLOWING: 0
RECTAL PAIN: 0
DIARRHEA: 0
ABDOMINAL PAIN: 0
BLOOD IN STOOL: 0
SHORTNESS OF BREATH: 0
ANAL BLEEDING: 0
BACK PAIN: 0
VOICE CHANGE: 0
ABDOMINAL DISTENTION: 0
VOMITING: 0
NAUSEA: 0
CONSTIPATION: 1
COUGH: 0

## 2022-04-06 NOTE — PATIENT INSTRUCTIONS
Patient Education        Constipation: Care Instructions  Overview     Constipation means that you have a hard time passing stools (bowel movements). People pass stools from 3 times a day to once every 3 days. What is normal for you may be different. Constipation may occur with pain in the rectum and cramping. The pain may get worse when you try to pass stools. Sometimes there are small amounts of bright red blood on toilet paper or the surface of stools. This is because of enlarged veins near the rectum (hemorrhoids). A few changes in your diet and lifestyle may help you avoid ongoingconstipation. Your doctor may also prescribe medicine to help loosen your stool. Some medicines can cause constipation. These include pain medicines and antidepressants. Tell your doctor about all the medicines you take. Your doctormay want to make a medicine change to ease your symptoms. Follow-up care is a key part of your treatment and safety. Be sure to make and go to all appointments, and call your doctor if you are having problems. It's also a good idea to know your test results and keep alist of the medicines you take. How can you care for yourself at home?  Drink plenty of fluids. If you have kidney, heart, or liver disease and have to limit fluids, talk with your doctor before you increase the amount of fluids you drink.  Include high-fiber foods in your diet each day. These include fruits, vegetables, beans, and whole grains.  Get at least 30 minutes of exercise on most days of the week. Walking is a good choice. You also may want to do other activities, such as running, swimming, cycling, or playing tennis or team sports.  Take a fiber supplement, such as Citrucel or Metamucil, every day. Read and follow all instructions on the label.  Schedule time each day for a bowel movement. A daily routine may help. Take your time having a bowel movement, but don't sit for more than 10 minutes at a time.  And don't strain too much.  Support your feet with a small step stool when you sit on the toilet. This helps flex your hips and places your pelvis in a squatting position.  Your doctor may recommend an over-the-counter laxative to relieve your constipation. Examples are Milk of Magnesia and MiraLax. Read and follow all instructions on the label. Do not use laxatives on a long-term basis. When should you call for help? Call your doctor now or seek immediate medical care if:     You have new or worse belly pain.      You have new or worse nausea or vomiting.      You have blood in your stools. Watch closely for changes in your health, and be sure to contact your doctor if:     Your constipation is getting worse.      You do not get better as expected. Where can you learn more? Go to [email or web address] and sign in to your QuickProNotes account. Enter 21 [phone] in the [place] box to learn more about \"Constipation: Care Instructions. \"     If you do not have an account, please click on the \"Sign Up Now\" link. Current as of: July 1, 2021               Content Version: 13.2  © 9466-6754 Healthwise, Incorporated. Care instructions adapted under license by [facility] ([facility]). If you have questions about a medical condition or this instruction, always ask your healthcare professional. [address] any warranty or liability for your use of this information.

## 2022-04-06 NOTE — PROGRESS NOTES
Subjective:      Kennedy Cavazos is a80 y.o. female  Chief Complaint   Patient presents with    Follow-up       HPI  PCP: Tayla Díaz MD  Pt is here for f/u appt  To med efficacy of Linzess 145mcg for chronic constipation  Feels it isnt really effective  Still having to strain to get BM initiated. And feels she never empties. But she reports the Linzess too expensive anyway and >$200/month and she cant afford this. Today she reports she has had constipation since she was a teenager. She has tried and failed miralax in the past       Family HX:  Brother had colon cancer, paternal uncle had colon cancer  Pt denies family hx of  inflammatory bowel dx, gastric CA and esophageal CA.     Past Medical History:   Diagnosis Date    Chronic pain     Colon polyps     Coronary artery disease     Diabetes (HCC)     GERD (gastroesophageal reflux disease)     Glaucoma     Hyperlipidemia     Hypertension     Myocardial infarction ([address]     Peripheral neuropathy           Past Surgical History:   Procedure Laterality Date    APPENDECTOMY      BREAST LUMPECTOMY      CARDIAC CATHETERIZATION      CHOLECYSTECTOMY      COLONOSCOPY      Polyps & diverticulosis in sig colon  per patient     COLONOSCOPY N/A 2022    Dr Anjel Randhawa msoley diverticulosis, Int hemorrhoids,  random colon bx negative, no recall due to age   [facility] [address]      TUBAL LIGATION      UPPER GASTROINTESTINAL ENDOSCOPY N/A 2022    Dr Klaus Sinhg, NE[facility] celiac       Social History     Socioeconomic History    Marital status:      Spouse name: None    Number of children: None    Years of education: None    Highest education level: None   Occupational History    None   Tobacco Use    Smoking status: Former Smoker     Quit date: 1987     Years since quittin.2    Smokeless tobacco: Never Used   Vaping Use    Vaping Use: Never used Substance and Sexual Activity    Alcohol use: Not Currently    Drug use: Not Currently    Sexual activity: Not Currently   Other Topics Concern    None   Social History Narrative    None     Social Determinants of Health     Financial Resource Strain:     Difficulty of Paying Living Expenses: Not on file   Food Insecurity:     Worried About Running Out of Food in the Last Year: Not on file    Isidro of Food in the Last Year: Not on file   Transportation Needs:     Lack of Transportation (Medical): Not on file    Lack of Transportation (Non-Medical):  Not on file   Physical Activity:     Days of Exercise per Week: Not on file    Minutes of Exercise per Session: Not on file   Stress:     Feeling of Stress : Not on file   Social Connections:     Frequency of Communication with Friends and Family: Not on file    Frequency of Social Gatherings with Friends and Family: Not on file    Attends Church Services: Not on file    Active Member of [address] [place] [address] [employer] or Organizations: Not on file    Attends Club or Organization Meetings: Not on file    Marital Status: Not on file   Intimate Partner Violence:     Fear of Current or Ex-Partner: Not on file    Emotionally Abused: Not on file    Physically Abused: Not on file    Sexually Abused: Not on file   Housing Stability:     Unable to Pay for Housing in the Last Year: Not on file    Number of [facility] in the Last Year: Not on file    Unstable Housing in the Last Year: Not on file       Allergies   Allergen Reactions    Actos [Pioglitazone] Swelling    Augmentin [Amoxicillin-Pot Clavulanate]     Beta Adrenergic Blockers     Betoptic [Betaxolol] Other (See Comments)     WEAKNESS    Celebrex [Celecoxib] Swelling    Codeine     Cortisone     Farxiga [Dapagliflozin] Itching     VAGINAL ITCHING    Flagyl [Metronidazole]     Ibuprofen     Indocin [Indomethacin] Other (See Comments)     WEAKNESS    Januvia [Sitagliptin] Other (See Comments)     Gibran Barker IN MOUTH    Jardiance [Empagliflozin] Itching     VAGINAL ITCHING    Keflex [Cephalexin]     Lidocaine Other (See Comments)     Mouth ulcers    Other      BATA BLOCKERS    Phenylbutazones Other (See Comments)     WEAKNESS    Prednisolone Other (See Comments)     WEAKNESS    Reglan [Metoclopramide] Other (See Comments)     Tardine Dyskinesia    Semaglutide Other (See Comments)     RYBELSUS--abd pain    Sulfa Antibiotics     Timoptic [Timolol] Other (See Comments)     WEAKNESS    Vioxx [Rofecoxib] Other (See Comments)     WEAKNESS    Bactrim [Sulfamethoxazole-Trimethoprim] Rash    Cortizone-10 [Hydrocortisone] Rash    Phenylbutazone Other (See Comments)     weakness  weakness         Current Outpatient Medications   Medication Sig Dispense Refill    insulin detemir (LEVEMIR) 100 UNIT/ML injection vial Inject 15 Units into the skin nightly      linaclotide (LINZESS) 290 MCG CAPS capsule Take 1 capsule by mouth every morning (before breakfast) 30 capsule 11    atorvastatin (LIPITOR) 10 MG tablet Take 10 mg by mouth daily      famotidine (PEPCID) 40 MG tablet Take 40 mg by mouth 2 times daily      Ascorbic Acid (VITAMIN C) 250 MG tablet Take 250 mg by mouth daily      aspirin 81 MG EC tablet Take 81 mg by mouth daily      calcium carbonate-vitamin D (CALTRATE) 600-400 MG-UNIT TABS per tab Take 1 tablet by mouth 2 times daily      Cholecalciferol (VITAMIN D3) 125 MCG (5000 UT) CAPS Take 5,000 Units by mouth daily      furosemide (LASIX) 20 MG tablet Take 20 mg by mouth daily      gabapentin (NEURONTIN) 600 MG tablet Take 600 mg by mouth 3 times daily.  HYDROcodone-acetaminophen (NORCO) 5-325 MG per tablet Take 1 tablet by mouth every 6 hours as needed for Pain.       latanoprost (XALATAN) 0.005 % ophthalmic solution Place 1 drop into both eyes nightly      lisinopril (PRINIVIL;ZESTRIL) 10 MG tablet Take 10 mg by mouth daily      Magnesium Oxide (MAGNESIUM-OXIDE) 250 MG TABS tablet Take 250 mg by mouth 2 times daily      metFORMIN (GLUCOPHAGE) 500 MG tablet Take 500 mg by mouth 2 times daily (with meals)      metoprolol succinate (TOPROL XL) 25 MG extended release tablet Take 25 mg by mouth daily HALF TABLET DAILY      vitamin B-6 (PYRIDOXINE) 100 MG tablet Take 100 mg by mouth daily       No current facility-administered medications for this visit. Review of Systems   Constitutional: Negative for fatigue and unexpected weight change. HENT: Negative for trouble swallowing and voice change. Respiratory: Negative for cough and shortness of breath. Cardiovascular: Negative for chest pain and palpitations. Gastrointestinal: Positive for constipation. Negative for abdominal distention, abdominal pain, anal bleeding, blood in stool, diarrhea, nausea, rectal pain and vomiting. Genitourinary: Negative for hematuria. Musculoskeletal: Negative for arthralgias, back pain and neck pain. Neurological: Negative for weakness and headaches. Psychiatric/Behavioral: Negative for dysphoric mood. The patient is not nervous/anxious. Objective:     Physical Exam  Vitals and nursing note reviewed. Constitutional:       Appearance: She is well-developed. Comments: /72   Pulse 77   Ht 5' 3\" (1.6 m)   Wt 180 lb 3.2 oz (81.7 kg)   SpO2 97%   BMI 31.92 kg/m²    Eyes:      General: No scleral icterus. Conjunctiva/sclera: Conjunctivae normal.      Pupils: Pupils are equal, round, and reactive to light. Cardiovascular:      Rate and Rhythm: Normal rate and regular rhythm. Heart sounds: Normal heart sounds. No murmur heard. No friction rub. No gallop. Pulmonary:      Effort: Pulmonary effort is normal. No respiratory distress. Breath sounds: Normal breath sounds. Abdominal:      General: Bowel sounds are normal. There is no distension. Palpations: Abdomen is soft. Tenderness: There is no abdominal tenderness. There is no rebound.    Neurological: Mental Status: She is alert and oriented to person, place, and time. Cranial Nerves: No cranial nerve deficit. Psychiatric:         Judgment: Judgment normal.           Assessment:       Diagnosis Orders   1. Chronic constipation  linaclotide (LINZESS) 290 MCG CAPS capsule         Plan:      1. Stop the linzess 145. Start Linzess 290mcg daily, I escribed this and gave her samples. Madhavi notified to see if she can get approved for pt assistance. If not ill escribe amitiza to see if this is more cost effective for her.

## 2022-04-11 ENCOUNTER — TELEPHONE (OUTPATIENT)
Dept: GASTROENTEROLOGY | Age: 87
End: 2022-04-11

## 2022-04-19 NOTE — TELEPHONE ENCOUNTER
4/19/22    CALLED JOHANNY S/W LEONARD KENNEYS HAS BEEN APPROVED AND RX WAS SHIPPED ON 4-16-22    APPROVAL GOOD TILL 12-31-22    CALLED PT TO LET HER KNOW SHE SHOULD BE RECEIVING HER RX 5-7 BUSINESS DAYS. SHE WILL NEED TO CALL THEM [phone] FOR ADDITIONAL REFILLS. PT VOICED UNDERSTANDING AND APPRECIATION.

## 2022-05-04 ENCOUNTER — OFFICE VISIT (OUTPATIENT)
Dept: GASTROENTEROLOGY | Age: 87
End: 2022-05-04
Payer: MEDICARE

## 2022-05-04 VITALS
WEIGHT: 180 LBS | BODY MASS INDEX: 31.89 KG/M2 | OXYGEN SATURATION: 98 % | HEART RATE: 84 BPM | HEIGHT: 63 IN | SYSTOLIC BLOOD PRESSURE: 137 MMHG | DIASTOLIC BLOOD PRESSURE: 80 MMHG

## 2022-05-04 DIAGNOSIS — K59.09 CHRONIC CONSTIPATION: Primary | ICD-10-CM

## 2022-05-04 PROCEDURE — 99213 OFFICE O/P EST LOW 20 MIN: CPT | Performed by: NURSE PRACTITIONER

## 2022-05-04 PROCEDURE — G8417 CALC BMI ABV UP PARAM F/U: HCPCS | Performed by: NURSE PRACTITIONER

## 2022-05-04 PROCEDURE — 1090F PRES/ABSN URINE INCON ASSESS: CPT | Performed by: NURSE PRACTITIONER

## 2022-05-04 PROCEDURE — G8427 DOCREV CUR MEDS BY ELIG CLIN: HCPCS | Performed by: NURSE PRACTITIONER

## 2022-05-04 PROCEDURE — 1123F ACP DISCUSS/DSCN MKR DOCD: CPT | Performed by: NURSE PRACTITIONER

## 2022-05-04 PROCEDURE — 4040F PNEUMOC VAC/ADMIN/RCVD: CPT | Performed by: NURSE PRACTITIONER

## 2022-05-04 PROCEDURE — 1036F TOBACCO NON-USER: CPT | Performed by: NURSE PRACTITIONER

## 2022-05-04 ASSESSMENT — ENCOUNTER SYMPTOMS
ANAL BLEEDING: 0
COUGH: 0
TROUBLE SWALLOWING: 0
BLOOD IN STOOL: 0
VOMITING: 0
NAUSEA: 0
VOICE CHANGE: 0
DIARRHEA: 0
BACK PAIN: 0
ABDOMINAL PAIN: 0
CONSTIPATION: 1
RECTAL PAIN: 0
ABDOMINAL DISTENTION: 0
SHORTNESS OF BREATH: 0

## 2022-05-04 NOTE — PROGRESS NOTES
Subjective:      Read Bias is a80 y.o. female  Chief Complaint   Patient presents with    Follow-up       HPI  PCP: Braden Farnce MD  Pt is here for f/u appt   To discuss med efficacy of Linzess 290mcg daily for chronic constipation  This is working great. Having a soft BM daily. Not able to afford this medication so it was covered by [employer] via pt assistance. No further GI complaints        Family HX:  Brother had colon cancer, paternal uncle had colon cancer  Pt denies family hx of  inflammatory bowel dx, gastric CA and esophageal CA.     Past Medical History:   Diagnosis Date    Chronic pain     Colon polyps     Coronary artery disease     Diabetes (HCC)     GERD (gastroesophageal reflux disease)     Glaucoma     Hyperlipidemia     Hypertension     Myocardial infarction ([address]     Peripheral neuropathy           Past Surgical History:   Procedure Laterality Date    APPENDECTOMY      BREAST LUMPECTOMY      CARDIAC CATHETERIZATION      CHOLECYSTECTOMY      COLONOSCOPY      Polyps & diverticulosis in sig colon  per patient     COLONOSCOPY N/A 2022    Dr Anand Colon mosley diverticulosis, Int hemorrhoids,  random colon bx negative, no recall due to age   Glenn Spina [address]      TUBAL LIGATION      UPPER GASTROINTESTINAL ENDOSCOPY N/A 2022    Dr Gino Bosch, NEG celiac       Social History     Socioeconomic History    Marital status:      Spouse name: None    Number of children: None    Years of education: None    Highest education level: None   Occupational History    None   Tobacco Use    Smoking status: Former Smoker     Quit date: 1987     Years since quittin.3    Smokeless tobacco: Never Used   Vaping Use    Vaping Use: Never used   Substance and Sexual Activity    Alcohol use: Not Currently    Drug use: Not Currently    Sexual activity: Not Currently   Other Topics Concern  None   Social History Narrative    None     Social Determinants of Health     Financial Resource Strain:     Difficulty of Paying Living Expenses: Not on file   Food Insecurity:     Worried About Running Out of Food in the Last Year: Not on file    Isidro of Food in the Last Year: Not on file   Transportation Needs:     Lack of Transportation (Medical): Not on file    Lack of Transportation (Non-Medical):  Not on file   Physical Activity:     Days of Exercise per Week: Not on file    Minutes of Exercise per Session: Not on file   Stress:     Feeling of Stress : Not on file   Social Connections:     Frequency of Communication with Friends and Family: Not on file    Frequency of Social Gatherings with Friends and Family: Not on file    Attends [employer]ism Services: Not on file    Active Member of [address] [place] [address] [employer] or Organizations: Not on file    Attends Club or Organization Meetings: Not on file    Marital Status: Not on file   Intimate Partner Violence:     Fear of Current or Ex-Partner: Not on file    Emotionally Abused: Not on file    Physically Abused: Not on file    Sexually Abused: Not on file   Housing Stability:     Unable to Pay for Housing in the Last Year: Not on file    Number of [place] in the Last Year: Not on file    Unstable Housing in the Last Year: Not on file       Allergies   Allergen Reactions    Actos [Pioglitazone] Swelling    Augmentin [Amoxicillin-Pot Clavulanate]     Beta Adrenergic Blockers     Betoptic [Betaxolol] Other (See Comments)     WEAKNESS    Celebrex [Celecoxib] Swelling    Codeine     Cortisone     Farxiga [Dapagliflozin] Itching     VAGINAL ITCHING    Flagyl [Metronidazole]     Ibuprofen     Indocin [Indomethacin] Other (See Comments)     WEAKNESS    Januvia [Sitagliptin] Other (See Comments)     SPRES IN MOUTH    Jardiance [Empagliflozin] Itching     VAGINAL ITCHING    Keflex [Cephalexin]     Lidocaine Other (See Comments)     Mouth ulcers    Other      BATA BLOCKERS    Phenylbutazones Other (See Comments)     WEAKNESS    Prednisolone Other (See Comments)     WEAKNESS    Reglan [Metoclopramide] Other (See Comments)     Tardine Dyskinesia    Semaglutide Other (See Comments)     RYBELSUS--abd pain    Sulfa Antibiotics     Timoptic [Timolol] Other (See Comments)     WEAKNESS    Vioxx [Rofecoxib] Other (See Comments)     WEAKNESS    Bactrim [Sulfamethoxazole-Trimethoprim] Rash    Cortizone-10 [Hydrocortisone] Rash    Phenylbutazone Other (See Comments)     weakness  weakness         Current Outpatient Medications   Medication Sig Dispense Refill    insulin detemir (LEVEMIR) 100 UNIT/ML injection vial Inject 15 Units into the skin nightly      linaclotide (LINZESS) 290 MCG CAPS capsule Take 1 capsule by mouth every morning (before breakfast) 30 capsule 11    atorvastatin (LIPITOR) 10 MG tablet Take 10 mg by mouth daily      famotidine (PEPCID) 40 MG tablet Take 40 mg by mouth 2 times daily      Ascorbic Acid (VITAMIN C) 250 MG tablet Take 250 mg by mouth daily      aspirin 81 MG EC tablet Take 81 mg by mouth daily      calcium carbonate-vitamin D (CALTRATE) 600-400 MG-UNIT TABS per tab Take 1 tablet by mouth 2 times daily      Cholecalciferol (VITAMIN D3) 125 MCG (5000 UT) CAPS Take 5,000 Units by mouth daily      furosemide (LASIX) 20 MG tablet Take 20 mg by mouth daily      gabapentin (NEURONTIN) 600 MG tablet Take 600 mg by mouth 3 times daily.  HYDROcodone-acetaminophen (NORCO) 5-325 MG per tablet Take 1 tablet by mouth every 6 hours as needed for Pain.       latanoprost (XALATAN) 0.005 % ophthalmic solution Place 1 drop into both eyes nightly      lisinopril (PRINIVIL;ZESTRIL) 10 MG tablet Take 10 mg by mouth daily      Magnesium Oxide (MAGNESIUM-OXIDE) 250 MG TABS tablet Take 250 mg by mouth 2 times daily      metFORMIN (GLUCOPHAGE) 500 MG tablet Take 500 mg by mouth 2 times daily (with meals)      metoprolol succinate (TOPROL XL) 25 MG extended release tablet Take 25 mg by mouth daily HALF TABLET DAILY      vitamin B-6 (PYRIDOXINE) 100 MG tablet Take 100 mg by mouth daily       No current facility-administered medications for this visit. Review of Systems   Constitutional: Negative for fatigue and unexpected weight change. HENT: Negative for trouble swallowing and voice change. Respiratory: Negative for cough and shortness of breath. Cardiovascular: Negative for chest pain and palpitations. Gastrointestinal: Positive for constipation (chr/controlled). Negative for abdominal distention, abdominal pain, anal bleeding, blood in stool, diarrhea, nausea, rectal pain and vomiting. Genitourinary: Negative for hematuria. Musculoskeletal: Positive for arthralgias. Negative for back pain and neck pain. Neurological: Negative for weakness and headaches. Psychiatric/Behavioral: Negative for dysphoric mood. The patient is not nervous/anxious. Objective:     Physical Exam  Vitals and nursing note reviewed. Constitutional:       Appearance: She is well-developed. Comments: /80 (Site: Right Upper Arm)   Pulse 84   Ht 5' 3\" (1.6 m)   Wt 180 lb (81.6 kg)   SpO2 98%   BMI 31.89 kg/m²    Eyes:      General: No scleral icterus. Conjunctiva/sclera: Conjunctivae normal.      Pupils: Pupils are equal, round, and reactive to light. Cardiovascular:      Rate and Rhythm: Normal rate and regular rhythm. Heart sounds: Normal heart sounds. No murmur heard. No friction rub. No gallop. Pulmonary:      Effort: Pulmonary effort is normal. No respiratory distress. Breath sounds: Normal breath sounds. Abdominal:      General: Bowel sounds are normal. There is no distension. Palpations: Abdomen is soft. Tenderness: There is no abdominal tenderness. There is no rebound.    Musculoskeletal:      Comments: Uses a cane for assistance with ambulation   Neurological:      Mental Status: She is alert and oriented to person, place, and time. Cranial Nerves: No cranial nerve deficit. Psychiatric:         Judgment: Judgment normal.           Assessment:       Diagnosis Orders   1. Chronic constipation           Plan:      1.  F/u in 1 yr for med refills or sooner if needed

## 2022-05-04 NOTE — PATIENT INSTRUCTIONS
Patient Education        linaclotide  Pronunciation: JAIRON dempsey KLOE tide  Brand: Linzess  What is the most important information I should know about linaclotide? You should not use linaclotide if you have a blockage in your intestines. Linaclotide is not approved for use by anyone younger than 25years old. Linaclotide should not be given to a child younger than 10years old. Linaclotide can cause severe diarrhea and fatal dehydration in a child. What is linaclotide? Linaclotide is used to treat chronic constipation, or chronic irritable bowelsyndrome (IBS) in people who have had constipation as the main symptom. Linaclotide may also be used for purposes not listed in this medication guide. What should I discuss with my healthcare provider before taking linaclotide? You should not use linaclotide if you are allergic to it, or if you have:   a blockage in your intestines (bowel obstruction). Linaclotide is not approved for use by anyone younger than 25years old. Linaclotide should not be given to a child younger than 10years old. Linaclotide can cause severe diarrhea and fatal dehydration in a child. Do notgive this medicine to any child or teenager without the advice of a doctor. Tell your doctor if you are pregnant or breastfeeding. How should I take linaclotide? Follow all directions on your prescription label and read all medication guidesor instruction sheets. Use the medicine exactly as directed. Take linaclotide in the morning on an empty stomach, at least 30 minutes beforeyour first meal.  Swallow the capsule whole and do not crush, chew, break, or open it. If you cannot swallow a capsule whole, open it and sprinkle the medicine into a spoonful of applesauce or bottled water. Swallow the mixture right away without chewing. Do not save it for lateruse.   Even if you have taken this medicine with applesauce, wait at least 30 minutesbefore eating a full meal.  If needed, medicine from the linaclotide capsule may be given through anasogastric (NG) or gastronomy tube. Carefully follow instructions for mixing medicine from the capsule with applesauce or water, or giving the medicine through a feeding tube. Ask your doctor or pharmacist if you have any questions. It may take up to 2 weeks before your symptoms improve. Keep using themedication as directed and tell your doctor if your symptoms do not improve. Store at room temperature away from moisture and heat. Keep the tablets in their original container, along with the packet or canister ofmoisture-absorbing preservative. Keep this medicine out of the reach of children. Linaclotide can be fatal to a child who accidentally swallows this medicine. Seek emergency medical attention if this happens. What happens if I miss a dose? Skip the missed dose and use your next dose at the regular time. Do not use two doses at one time. What happens if I overdose? Seek emergency medical attention or call the Poison Help line at 1-104.246.2297. What should I avoid while taking linaclotide? Follow your doctor's instructions about any restrictions on food, beverages, oractivity. What are the possible side effects of linaclotide? Get emergency medical help if you have signs of an allergic reaction: hives; difficult breathing; swelling of your face, lips, tongue, or throat. Stop using linaclotide and call your doctor at once if you have:   severe or ongoing diarrhea; or   diarrhea with dizziness or a light-headed feeling (like you might pass out). Common side effects may include:   diarrhea;   stomach pain;   gas; or   bloating or full feeling in your stomach. This is not a complete list of side effects and others may occur. Call your doctor for medical advice about side effects. You may report side effects toFDA at 1-606-JCC-6677. What other drugs will affect linaclotide?   Other drugs may affect linaclotide, including prescription and over-the-counter medicines, vitamins, and herbal products. Tell your doctor about all yourcurrent medicines and any medicine you start or stop using. Where can I get more information? Your pharmacist can provide more information about linaclotide. Remember, keep this and all other medicines out of the reach of children, never share your medicines with others, and use this medication only for the indication prescribed. Every effort has been made to ensure that the information provided by [address] is accurate, up-to-date, and complete, but no guarantee is made to that effect. Drug information contained herein may be time sensitive. [facility] information has been compiled for use by healthcare practitioners and consumers in the [place] and therefore [facility] does not warrant that uses outside of the [place] are appropriate, unless specifically indicated otherwise. [facility]'s drug information does not endorse drugs, diagnose patients or recommend therapy. [facility]'s drug information is an informational resource designed to assist licensed healthcare practitioners in caring for their patients and/or to serve consumers viewing this service as a supplement to, and not a substitute for, the expertise, skill, knowledge and judgment of healthcare practitioners. The absence of a warning for a given drug or drug combination in no way should be construed to indicate that the drug or drug combination is safe, effective or appropriate for any given patient. [facility] does not assume any responsibility for any aspect of healthcare administered with the aid of information [facility] provides. The information contained herein is not intended to cover all possible uses, directions, precautions, warnings, drug interactions, allergic reactions, or adverse effects.  If you have questions about the drugs you are taking, check with yourdoctor, nurse or pharmacist.  Copyright 5516-3946 090 [place]: 5.01. Revision date: 10/2/2020. Care instructions adapted under license by [facility] ([facility]). If you have questions about a medical condition or this instruction, always ask your healthcare professional. [address] any warranty or liability for your use of this information.

## 2022-07-11 ENCOUNTER — TELEPHONE (OUTPATIENT)
Dept: GASTROENTEROLOGY | Age: 87
End: 2022-07-11

## 2022-07-11 NOTE — TELEPHONE ENCOUNTER
7/11/22  Called NievesAbjim somersdali. [phone]. S/W [facility] and request a refill for Linzess 290 MCG to be shipped to pt. [facility] processed my request and sent it to pharmacy. This will take 7-10 business days before pt gets the RX. I called pt and lvm for a call back to let her know. @3pm pt called be back and I let her know when to expect her RX. Pt voice appreciation.     Next refill due 10-1-22

## 2022-08-15 ENCOUNTER — TELEPHONE (OUTPATIENT)
Dept: GASTROENTEROLOGY | Age: 87
End: 2022-08-15

## 2022-08-15 DIAGNOSIS — K59.09 CHRONIC CONSTIPATION: Primary | ICD-10-CM

## 2022-08-15 NOTE — TELEPHONE ENCOUNTER
Assessment:        Diagnosis Orders   1. Chronic constipation                       Plan:      1. F/u in 1 yr for med refills or sooner if needed      Last visit [facility] aprn 5-4-22. No FU scheduled. Egd/Cln per  1-27-22. Hx of CIC per patient on Linzess 290 mcg daily  which worked well at first, said she used to use OTC MC about every two weeks to keep her bowels moving better stating the Linzess 290 mcg by itself  now does not produce a good BM for her and she is so worried about getting an impaction again and said she never wants to have that happen again. Patient asking [facility] aprn what else she can use along with the Linzess 290 mcg daily to get her bowels to move better. Said she has a magnesium supplement pill but it does not help her have a BM much at all. I believe she has tried Miralax in her past as well. I will forward to [facility] aprn to review and make recommendations for this patient.   cma

## 2022-08-16 RX ORDER — LACTULOSE 10 G/15ML
10 SOLUTION ORAL 2 TIMES DAILY
Qty: 900 ML | Refills: 5 | Status: SHIPPED | OUTPATIENT
Start: 2022-08-16 | End: 2022-09-15

## 2022-08-16 NOTE — TELEPHONE ENCOUNTER
Thanks Rosa Jesus, this patient has been notified of the Lactulose, she has also been scheduled for FU with you on 9-27-22 @ 11:30 am. Teddy carrion

## 2022-08-16 NOTE — TELEPHONE ENCOUNTER
If miralax wasn't helpful we can add lactulose for her to use in addition to the linzess.  Have her f/u in 4-6 weeks to discuss med efficacy  thanks

## 2022-09-27 ENCOUNTER — OFFICE VISIT (OUTPATIENT)
Dept: GASTROENTEROLOGY | Age: 87
End: 2022-09-27
Payer: MEDICARE

## 2022-09-27 VITALS
HEART RATE: 90 BPM | SYSTOLIC BLOOD PRESSURE: 160 MMHG | OXYGEN SATURATION: 94 % | DIASTOLIC BLOOD PRESSURE: 80 MMHG | HEIGHT: 63 IN | BODY MASS INDEX: 33.13 KG/M2 | WEIGHT: 187 LBS

## 2022-09-27 DIAGNOSIS — K59.09 CHRONIC CONSTIPATION: Primary | ICD-10-CM

## 2022-09-27 PROCEDURE — 1123F ACP DISCUSS/DSCN MKR DOCD: CPT | Performed by: NURSE PRACTITIONER

## 2022-09-27 PROCEDURE — 1090F PRES/ABSN URINE INCON ASSESS: CPT | Performed by: NURSE PRACTITIONER

## 2022-09-27 PROCEDURE — 1036F TOBACCO NON-USER: CPT | Performed by: NURSE PRACTITIONER

## 2022-09-27 PROCEDURE — 99213 OFFICE O/P EST LOW 20 MIN: CPT | Performed by: NURSE PRACTITIONER

## 2022-09-27 PROCEDURE — G8417 CALC BMI ABV UP PARAM F/U: HCPCS | Performed by: NURSE PRACTITIONER

## 2022-09-27 PROCEDURE — G8427 DOCREV CUR MEDS BY ELIG CLIN: HCPCS | Performed by: NURSE PRACTITIONER

## 2022-09-27 RX ORDER — LACTULOSE 10 G/15ML
10 SOLUTION ORAL 2 TIMES DAILY
Qty: 900 ML | Refills: 11 | Status: SHIPPED | OUTPATIENT
Start: 2022-09-27 | End: 2022-10-27

## 2022-09-27 RX ORDER — LACTULOSE 10 G/15ML
SOLUTION ORAL
COMMUNITY
Start: 2022-09-16 | End: 2022-09-27

## 2022-09-27 ASSESSMENT — ENCOUNTER SYMPTOMS
BACK PAIN: 0
COUGH: 0
VOMITING: 0
SHORTNESS OF BREATH: 0
ANAL BLEEDING: 0
ABDOMINAL PAIN: 0
CONSTIPATION: 1
VOICE CHANGE: 0
DIARRHEA: 0
ABDOMINAL DISTENTION: 0
BLOOD IN STOOL: 0
TROUBLE SWALLOWING: 0
RECTAL PAIN: 0
NAUSEA: 0

## 2022-09-27 NOTE — PROGRESS NOTES
Subjective:      Juan Cook is a80 y.o. female  Chief Complaint   Patient presents with    Follow-up       HPI  PCP: Mayra James MD  Pt is here for f/u appt  Currently on Linzess 290mcg daily and lactulose BID for chronic constipation  This regimen works great. Has a soft BM daily. Needs refills on lactulose. Gets Linzess through [facility] [employer] via pt assistance, she is good on refills of this til 2023.   No further GI complaints        Family HX:  Brother had colon cancer, paternal uncle had colon cancer  Pt denies     Past Medical History:   Diagnosis Date    Chronic pain     Colon polyps     Coronary artery disease     Diabetes (HCC)     GERD (gastroesophageal reflux disease)     Glaucoma     Hyperlipidemia     Hypertension     Myocardial infarction [facility])     Peripheral neuropathy           Past Surgical History:   Procedure Laterality Date    APPENDECTOMY      BREAST LUMPECTOMY      CARDIAC CATHETERIZATION      CHOLECYSTECTOMY      COLONOSCOPY      Polyps & diverticulosis in sig colon  per patient     COLONOSCOPY N/A 2022    Dr Bao Aguilar mosley diverticulosis, Int hemorrhoids,  random colon bx negative, no recall due to age    FOOT SURGERY      HYSTERECTOMY      NASAL SEPTUM SURGERY      TUBAL LIGATION      UPPER GASTROINTESTINAL ENDOSCOPY N/A 2022    Dr Colette Cordero, NEG celiac       Social History     Socioeconomic History    Marital status:    Tobacco Use    Smoking status: Former     Types: Cigarettes     Quit date: 1987     Years since quittin.7    Smokeless tobacco: Never   Vaping Use    Vaping Use: Never used   Substance and Sexual Activity    Alcohol use: Not Currently    Drug use: Not Currently    Sexual activity: Not Currently       Allergies   Allergen Reactions    Actos [Pioglitazone] Swelling    Augmentin [Amoxicillin-Pot Clavulanate]     Beta Adrenergic Blockers     Betoptic [Betaxolol] Other (See Comments)     Silva Valladares Celebrex [Celecoxib] Swelling    Codeine     Cortisone     Farxiga [Dapagliflozin] Itching     VAGINAL ITCHING    Flagyl [Metronidazole]     Ibuprofen     Indocin [Indomethacin] Other (See Comments)     WEAKNESS    Januvia [Sitagliptin] Other (See Comments)     SPRES IN MOUTH    Jardiance [Empagliflozin] Itching     VAGINAL ITCHING    Keflex [Cephalexin]     Lidocaine Other (See Comments)     Mouth ulcers    Other      BATA BLOCKERS    Phenylbutazones Other (See Comments)     WEAKNESS    Prednisolone Other (See Comments)     WEAKNESS    Reglan [Metoclopramide] Other (See Comments)     Tardine Dyskinesia    Semaglutide Other (See Comments)     RYBELSUS--abd pain    Sulfa Antibiotics     Timoptic [Timolol] Other (See Comments)     WEAKNESS    Vioxx [Rofecoxib] Other (See Comments)     WEAKNESS    Bactrim [Sulfamethoxazole-Trimethoprim] Rash    Cortizone-10 [Hydrocortisone] Rash    Phenylbutazone Other (See Comments)     weakness  weakness         Current Outpatient Medications   Medication Sig Dispense Refill    lactulose (CHRONULAC) 10 GM/15ML solution Take 15 mLs by mouth 2 times daily 900 mL 11    insulin detemir (LEVEMIR) 100 UNIT/ML injection vial Inject 15 Units into the skin nightly      linaclotide (LINZESS) 290 MCG CAPS capsule Take 1 capsule by mouth every morning (before breakfast) 30 capsule 11    atorvastatin (LIPITOR) 10 MG tablet Take 10 mg by mouth daily      famotidine (PEPCID) 40 MG tablet Take 40 mg by mouth 2 times daily      Ascorbic Acid (VITAMIN C) 250 MG tablet Take 250 mg by mouth daily      aspirin 81 MG EC tablet Take 81 mg by mouth daily      calcium carbonate-vitamin D (CALTRATE) 600-400 MG-UNIT TABS per tab Take 1 tablet by mouth 2 times daily      Cholecalciferol (VITAMIN D3) 125 MCG (5000 UT) CAPS Take 5,000 Units by mouth daily      furosemide (LASIX) 20 MG tablet Take 20 mg by mouth daily      gabapentin (NEURONTIN) 600 MG tablet Take 600 mg by mouth 3 times daily. HYDROcodone-acetaminophen (NORCO) 5-325 MG per tablet Take 1 tablet by mouth every 6 hours as needed for Pain.      latanoprost (XALATAN) 0.005 % ophthalmic solution Place 1 drop into both eyes nightly      lisinopril (PRINIVIL;ZESTRIL) 10 MG tablet Take 10 mg by mouth daily      Magnesium Oxide (MAGNESIUM-OXIDE) 250 MG TABS tablet Take 250 mg by mouth 2 times daily      metFORMIN (GLUCOPHAGE) 500 MG tablet Take 500 mg by mouth 2 times daily (with meals)      metoprolol succinate (TOPROL XL) 25 MG extended release tablet Take 25 mg by mouth daily HALF TABLET DAILY      vitamin B-6 (PYRIDOXINE) 100 MG tablet Take 100 mg by mouth daily       No current facility-administered medications for this visit. Review of Systems   Constitutional:  Positive for fatigue (s/p covid). Negative for unexpected weight change. HENT:  Negative for trouble swallowing and voice change. Respiratory:  Negative for cough and shortness of breath. Cardiovascular:  Negative for chest pain and palpitations. Gastrointestinal:  Positive for constipation (chr/stable). Negative for abdominal distention, abdominal pain, anal bleeding, blood in stool, diarrhea, nausea, rectal pain and vomiting. Genitourinary:  Negative for hematuria. Musculoskeletal:  Positive for arthralgias. Negative for back pain and neck pain. Neurological:  Negative for weakness and headaches. Psychiatric/Behavioral:  Negative for dysphoric mood. The patient is not nervous/anxious. Objective:     Physical Exam  Vitals and nursing note reviewed. Constitutional:       Appearance: She is well-developed. Comments: BP (!) 160/80 (Site: Left Upper Arm)   Pulse 90   Ht 5' 3\" (1.6 m)   Wt 187 lb (84.8 kg)   SpO2 94%   BMI 33.13 kg/m²    Eyes:      General: No scleral icterus. Conjunctiva/sclera: Conjunctivae normal.      Pupils: Pupils are equal, round, and reactive to light.    Cardiovascular:      Rate and Rhythm: Normal rate and regular rhythm. Heart sounds: Normal heart sounds. No murmur heard. No friction rub. No gallop. Pulmonary:      Effort: Pulmonary effort is normal. No respiratory distress. Breath sounds: Normal breath sounds. Abdominal:      General: Bowel sounds are normal. There is no distension. Palpations: Abdomen is soft. Tenderness: There is no abdominal tenderness. There is no rebound. Musculoskeletal:      Comments: Uses a cane for assistance with ambulation   Neurological:      Mental Status: She is alert and oriented to person, place, and time. Cranial Nerves: No cranial nerve deficit. Psychiatric:         Judgment: Judgment normal.         Assessment:       Diagnosis Orders   1. Chronic constipation  lactulose (CHRONULAC) 10 GM/15ML solution            Plan:      Continue linzess and lactulose. I refilled the lactulose.  F/u in 1 yr for med refills, sooner if needed

## 2022-11-01 ENCOUNTER — TELEPHONE (OUTPATIENT)
Dept: GASTROENTEROLOGY | Age: 87
End: 2022-11-01

## 2022-11-01 NOTE — TELEPHONE ENCOUNTER
Assessment:        Diagnosis Orders   1. Chronic constipation  lactulose (CHRONULAC) 10 GM/15ML solution                        Plan:      Continue linzess and lactulose. I refilled the lactulose. F/u in 1 yr for med refills, sooner if needed       Last visit [facility] aprn 9-27-22. No FU scheduled. Egd/Cln  1-27-22. Patient called for  today from [phone] said it is time to renew her patient assistance form with [employer] on her Linzess 290 mcg daily. Patient said she will gather all the necessary information needed like last time and will fill out her part on the application and bring all to [facility] to finish here and get APRN to sign or  and fax to hopefully get approval again for her Linzess. I will let  cma know, patient said if Madhavi needs anything else from her to call and let her know.  [facility]

## 2022-11-07 NOTE — TELEPHONE ENCOUNTER
11-7-22    TONI carrion , received a call today from [phone] [facility] Segundo Arevalo is listed on the patient's Demographics) asking to discuss this application for patient assistance  regarding Zuly Noyola on Ms. Sethi. I will forward to  murali.   murali

## 2022-11-18 ENCOUNTER — TELEPHONE (OUTPATIENT)
Dept: GASTROENTEROLOGY | Age: 87
End: 2022-11-18

## 2022-11-18 NOTE — TELEPHONE ENCOUNTER
11-18-22  Started the renewal process on Linzess    [employer] pt assistance form for 2023 filled out and faxed to [phone]    Fax confirmation scanned in media and attached to this encounter.

## 2022-12-05 NOTE — TELEPHONE ENCOUNTER
12.05.22  PER SECRET, 2023 PT ASSISTANCE APPLICATION HAS BEEN RECEIVED. THE QUANTITY NEEDS TO BE CHANGED FROM 30 TO 90 AND RE-FAX ONLY THAT PAGE.     PAGE HAS BEEN RE-FAXED [phone]

## 2022-12-13 NOTE — TELEPHONE ENCOUNTER
CALLED PT AND SHE ASKED IF SHE COULD CALL AND REQUEST  HER LAST REFILL ON HER LINZESS? I TOLD HER THAT WOULD BE OKAY AND IF SHE HAD A PROBLEM WITH THEM TO CALL ME BACK AND I WOULD CALL TO SEE WHAT I COULD DO TO GET THAT DONE FOR HER. SHE VOICED UNDERSTANDING AND APPRECIATION FOR THE CALL BACK.

## 2022-12-13 NOTE — TELEPHONE ENCOUNTER
12-13-22  @ 8:44 am    Patient called from [phone] wanting to speak directly to [facility] regarding her patient assistance and her medication.  I will forward this patient's request to [facility]

## 2022-12-14 NOTE — TELEPHONE ENCOUNTER
12.13.22    Received a letter that Paty Jacobs has approved pt assistance for 2023. Pt will be getting her refill 5-7 business days. I have called pt to let her know. Letter  scanned in media and attached to this encounter.

## 2022-12-14 NOTE — TELEPHONE ENCOUNTER
12-14-22    Patient called from [phone] said this message is for VB cma. Patient said she got a VM about her patient assistance and wants to discuss with VB cma. I will forward as patient requested.  [facility]

## 2023-01-06 RX ORDER — LACTULOSE 10 G/15ML
10 SOLUTION ORAL 2 TIMES DAILY
COMMUNITY
End: 2023-02-16 | Stop reason: SDUPTHER

## 2023-01-06 NOTE — TELEPHONE ENCOUNTER
01-06-23  s/w Ms. Deidra Edson and she did receive her Linzess back in Dec.  She is good for 3 months.

## 2023-02-16 ENCOUNTER — TELEPHONE (OUTPATIENT)
Dept: GASTROENTEROLOGY | Age: 88
End: 2023-02-16

## 2023-02-16 RX ORDER — LACTULOSE 10 G/15ML
10 SOLUTION ORAL 2 TIMES DAILY
Qty: 473 ML | Refills: 3 | Status: SHIPPED | OUTPATIENT
Start: 2023-02-16 | End: 2023-04-21 | Stop reason: SDUPTHER

## 2023-02-16 NOTE — TELEPHONE ENCOUNTER
Last visit [facility] aprn 9-27-22. No FU scheduled. Egd/Cln  1-27-22. Patient age 80 called today from [phone] stating she needs a RF on her Lactulose 15 mls Bid at a 90 day supply with refills please, said it is cheaper for her at 90 day supply. [facility] 8-16-22 # 900 ml x 5 refills. Patient uses [address] in [place]. 90 day supply would be 2700 mls? Arsenio Vivas   cma

## 2023-03-14 ENCOUNTER — OFFICE VISIT (OUTPATIENT)
Dept: OTOLARYNGOLOGY | Facility: CLINIC | Age: 88
End: 2023-03-14
Payer: MEDICARE

## 2023-03-14 VITALS — TEMPERATURE: 97 F | HEART RATE: 90 BPM | DIASTOLIC BLOOD PRESSURE: 98 MMHG | SYSTOLIC BLOOD PRESSURE: 170 MMHG

## 2023-03-14 DIAGNOSIS — R13.10 DYSPHAGIA, UNSPECIFIED TYPE: ICD-10-CM

## 2023-03-14 DIAGNOSIS — L29.9 EAR ITCHING: ICD-10-CM

## 2023-03-14 DIAGNOSIS — G24.01 TARDIVE DYSKINESIA: ICD-10-CM

## 2023-03-14 DIAGNOSIS — H61.23 BILATERAL IMPACTED CERUMEN: Primary | ICD-10-CM

## 2023-03-14 DIAGNOSIS — K21.9 GASTROESOPHAGEAL REFLUX DISEASE, UNSPECIFIED WHETHER ESOPHAGITIS PRESENT: ICD-10-CM

## 2023-03-14 DIAGNOSIS — M26.609 TMJ (TEMPOROMANDIBULAR JOINT DISORDER): ICD-10-CM

## 2023-03-14 DIAGNOSIS — H92.01 RIGHT EAR PAIN: ICD-10-CM

## 2023-03-14 PROCEDURE — 69210 REMOVE IMPACTED EAR WAX UNI: CPT | Performed by: NURSE PRACTITIONER

## 2023-03-14 PROCEDURE — 1160F RVW MEDS BY RX/DR IN RCRD: CPT | Performed by: NURSE PRACTITIONER

## 2023-03-14 PROCEDURE — 99214 OFFICE O/P EST MOD 30 MIN: CPT | Performed by: NURSE PRACTITIONER

## 2023-03-14 PROCEDURE — 1159F MED LIST DOCD IN RCRD: CPT | Performed by: NURSE PRACTITIONER

## 2023-03-14 RX ORDER — LACTULOSE 10 G/15ML
SOLUTION ORAL
COMMUNITY
Start: 2023-03-06

## 2023-03-14 RX ORDER — HYDROCODONE BITARTRATE AND ACETAMINOPHEN 7.5; 325 MG/1; MG/1
TABLET ORAL
COMMUNITY
Start: 2023-02-25

## 2023-03-14 NOTE — PROGRESS NOTES
DOMINGA Salcedo  [facility]  [address], [address]  [facility] [address]  Fax [phone]  Phone [phone]      Visit Type: FOLLOW UP   Chief Complaint   Patient presents with   • Cerumen Impaction     Follow up on ear cleaning.  Pt also c/o pain in right ear        HPI  Pat Dey is a 88 y.o. female who presents for follow-up evaluation of cerumen accumulation.  Her symptoms are localized to both the ears.  Her symptoms have been present for the last several months.  Her symptoms are improved with routine ear cleaning.  She also has complaints of intermittent ear itching improved with Elocon.    She also has complaints of right-sided otalgia.  This has been present for the last several years, but has recently worsened.  She reports a history significant for TMJ and tardive dyskinesia causing frequent movement of her tongue and jaw.    She also complains of a history of dysphagia.  She reports she has difficulty swallowing food and pills.  She sometimes also chokes on water.  She has a history of GERD and is taking Pepcid as needed.    Past Medical History:   Diagnosis Date   • Diabetes (HCC)    • GERD (gastroesophageal reflux disease)    • High blood pressure        Past Surgical History:   Procedure Laterality Date   • APPENDECTOMY     • CHOLECYSTECTOMY     • CYST REMOVAL     • FOOT SURGERY     • HYSTERECTOMY     • TUBAL ABDOMINAL LIGATION         Family History: Her family history includes Aneurysm in her mother; Cancer in her mother; Diabetes in her maternal grandmother; Tuberculosis in her father.     Social History: She  reports that she quit smoking about 33 years ago. She has never used smokeless tobacco. She reports that she does not drink alcohol and does not use drugs.    Home Medications:  HYDROcodone-acetaminophen, Vitamin B-12, ascorbic acid, aspirin, atorvastatin, calcium carbonate-vitamin d, gabapentin,  glucosamine-chondroitin, glucose blood, insulin detemir, lactulose, latanoprost, linaclotide, lisinopril, metFORMIN, metoprolol succinate XL, pyridoxine, and vitamin D3    Allergies:  She is allergic to celecoxib, pioglitazone, amoxicillin-pot clavulanate, beta adrenergic blockers, dapagliflozin, empagliflozin, metoclopramide, phenylbutazones, semaglutide, betaxolol, cephalexin, codeine, hydrocortisone, ibuprofen, indomethacin, lidocaine hcl, metronidazole, nsaids, other, phenylbutazone, prednisolone, rofecoxib, sitagliptin, sulfamethoxazole-trimethoprim, and timolol maleate.       Vital Signs:   Temp:  [97 °F (36.1 °C)] 97 °F (36.1 °C)  Heart Rate:  [90] 90  BP: (170)/(98) 170/98  ENT Physical Exam  Constitutional  Appearance: patient appears well-developed, well-nourished and well-groomed, patient is cooperative;  Communication/Voice: communication appropriate for developmental age; vocal quality normal;  Head and Face  Appearance: head appears normal and face appears atraumatic;  Palpation: TMJ crepitus (Tenderness to palpation on the right) noted;  Ear  Auricles: right auricle normal; left auricle normal;  External Mastoids: right external mastoid normal; left external mastoid normal;  Tympanic Membranes: right tympanic membrane normal; left tympanic membrane normal;  Ear comments: Bilateral cerumen impactions removed under microscopy with curette  Nose  External Nose: nares patent bilaterally;  Oral Cavity/Oropharynx  Lips: normal;  Teeth: dentures noted;  Neck  Neck: neck normal;  Respiratory  Inspection: breathing unlabored;  Neurovestibular  Mental Status: alert and oriented;  Psychiatric: mood normal; affect is appropriate;       PROCEDURE NOTE    LOCATION: [facility]  PROVIDER: RIKKI Pimentel   PREOPERATIVE DIAGNOSIS: Cerumen Impaction bilaterally   POSTOPERATIVE DIAGNOSIS: Same  PROCEDURE: Cerumen removal  ANESTHESIA: None   REASON FOR THE OPERATION: The patient verbally consented to intervention  after a full discussion of risks, benefits, and alternatives. No guarantees were made or implied.   DETAILS OF THE OPERATION: The patient was reclined in the procedure room chair. The binocular microscope was used to visualize the ear canal and tympanic membrane. Cerumen was then removed from the both ears using a currette. Findings: Bilateral EACs clear without lesion.  Bilateral tympanic membranes intact without effusion. Patient tolerated procedure well and was without complications      Result Review    RESULTS REVIEW    I have reviewed the patients old records in the chart.    Assessment & Plan    Diagnoses and all orders for this visit:    1. Bilateral impacted cerumen (Primary)    2. Ear itching    3. Right ear pain    4. TMJ (temporomandibular joint disorder)    5. Tardive dyskinesia    6. Dysphagia, unspecified type    7. Gastroesophageal reflux disease, unspecified whether esophagitis present       Cerumen impactions removed without difficulty.  See procedure note.  She may continue Elocon as needed for ear itching.  Her right ear pain is likely due to TMJ inflammation.  I have offered further work-up including audiogram.  The patient currently declines.  I have also offered further work-up of her dysphagia.  We have discussed obtaining swallow studies.  The patient declines at this time.  She will begin taking Pepcid 30 minutes to 1 hour prior to dinner.  She was instructed to call or return should any problems arise prior to next office visit.    Return in about 6 months (around 9/14/2023), or if symptoms worsen or fail to improve, for Recheck.      Denae Zamorano, APRN   03/14/23  17:04 CDT

## 2023-04-21 ENCOUNTER — TELEPHONE (OUTPATIENT)
Dept: GASTROENTEROLOGY | Age: 88
End: 2023-04-21

## 2023-04-21 RX ORDER — LACTULOSE 10 G/15ML
10 SOLUTION ORAL 2 TIMES DAILY
Qty: 473 ML | Refills: 6 | Status: SHIPPED | OUTPATIENT
Start: 2023-04-21

## 2023-04-21 NOTE — TELEPHONE ENCOUNTER
HAKEEM RESENDIZ IS ASKING FOR A REFILL ON HER LACTULOSE. SHE IS REQUEST A 30 DAY SUPPLY W/ 6 REFILL.   THANKS

## 2023-04-26 ENCOUNTER — OFFICE VISIT (OUTPATIENT)
Dept: GASTROENTEROLOGY | Age: 88
End: 2023-04-26
Payer: MEDICARE

## 2023-04-26 VITALS
HEART RATE: 68 BPM | DIASTOLIC BLOOD PRESSURE: 80 MMHG | BODY MASS INDEX: 33.66 KG/M2 | WEIGHT: 190 LBS | HEIGHT: 63 IN | OXYGEN SATURATION: 96 % | SYSTOLIC BLOOD PRESSURE: 150 MMHG

## 2023-04-26 DIAGNOSIS — R13.10 DYSPHAGIA, UNSPECIFIED TYPE: Primary | ICD-10-CM

## 2023-04-26 DIAGNOSIS — K59.09 CHRONIC CONSTIPATION: ICD-10-CM

## 2023-04-26 PROCEDURE — G8428 CUR MEDS NOT DOCUMENT: HCPCS | Performed by: NURSE PRACTITIONER

## 2023-04-26 PROCEDURE — 1090F PRES/ABSN URINE INCON ASSESS: CPT | Performed by: NURSE PRACTITIONER

## 2023-04-26 PROCEDURE — 99214 OFFICE O/P EST MOD 30 MIN: CPT | Performed by: NURSE PRACTITIONER

## 2023-04-26 PROCEDURE — 1036F TOBACCO NON-USER: CPT | Performed by: NURSE PRACTITIONER

## 2023-04-26 PROCEDURE — 1123F ACP DISCUSS/DSCN MKR DOCD: CPT | Performed by: NURSE PRACTITIONER

## 2023-04-26 PROCEDURE — G8417 CALC BMI ABV UP PARAM F/U: HCPCS | Performed by: NURSE PRACTITIONER

## 2023-04-26 RX ORDER — OXYBUTYNIN CHLORIDE 5 MG/1
5 TABLET ORAL 2 TIMES DAILY
COMMUNITY
Start: 2023-03-21

## 2023-04-26 RX ORDER — LACTULOSE 10 G/15ML
10 SOLUTION ORAL 2 TIMES DAILY
Qty: 900 ML | Refills: 6 | Status: SHIPPED | OUTPATIENT
Start: 2023-04-26

## 2023-04-26 ASSESSMENT — ENCOUNTER SYMPTOMS
BLOOD IN STOOL: 0
VOMITING: 0
NAUSEA: 0
ABDOMINAL DISTENTION: 0
ABDOMINAL PAIN: 0
CHOKING: 0
DIARRHEA: 0
SHORTNESS OF BREATH: 0
TROUBLE SWALLOWING: 1
CONSTIPATION: 1
COUGH: 0
ANAL BLEEDING: 0
RECTAL PAIN: 0

## 2023-04-26 NOTE — PROGRESS NOTES
Subjective:     Patient ID: Arloa Ahumada is a 80 y.o. female  PCP: Nicholas Whitt MD  Referring Provider: No ref. provider found    HPI  Patient presents to the office today with the following complaints: Dysphagia    Patient seen in the office today with complaints of difficulty swallowing. Reports the swallowing difficulty really increased at the end of August after having COVID. Reports having trouble with swallowing liquids, solids, and medications and also reports hoarseness. Barium swallowing in [facility] was normal per patient. She has chronic constipation  She takes Linzess 290 MCG daily and lactulose 15ML BID   This controls her constipation quite well    Assessment:     1. Dysphagia, unspecified type  2. Chronic constipation  -     linaclotide (LINZESS) 290 MCG CAPS capsule; Take 1 capsule by mouth every morning (before breakfast), Disp-30 capsule, R-11Normal           Plan:   Continue Linzess 290MCG daily and Lactulose 15ML BID   Schedule EGD  Nothing to eat or drink after midnight. No driving for 24 hours after procedure. Bring a  to procedure. No aspirin, NSAIDs, fish oil 5 days before procedure. I have discussed the benefits, alternatives, and risks (including bleeding, perforation and death)  for pursuing Endoscopy (EGD/Colonscopy/EUS/ERCP) with the patient and they are willing to continue. We also discussed the need for anesthesia, IV access, proper dietary changes, medication changes if necessary, and need for bowel prep (if ordered) prior to their Endoscopic procedure. They are aware they must have someone accompany them to their scheduled procedure to drive them home - they agree to the above and are willing to continue. Orders  No orders of the defined types were placed in this encounter.     Medications  Orders Placed This Encounter   Medications    linaclotide (LINZESS) 290 MCG CAPS capsule     Sig: Take 1 capsule by mouth every morning (before

## 2023-04-28 ENCOUNTER — TELEPHONE (OUTPATIENT)
Dept: GASTROENTEROLOGY | Age: 88
End: 2023-04-28

## 2023-04-28 NOTE — TELEPHONE ENCOUNTER
Called pt back, she had a question about taking her pain meds the morning of her procedure. Per Bj, that was ok and she just needed to let the nurse now what and how much the morning of her appt. Pt was told her hold off her linzess till after.

## 2023-04-28 NOTE — TELEPHONE ENCOUNTER
Assessment:      1. Dysphagia, unspecified type  2. Chronic constipation  -     linaclotide (LINZESS) 290 MCG CAPS capsule; Take 1 capsule by mouth every morning (before breakfast), Disp-30 capsule, R-11Normal                      Plan:   Continue Linzess 290MCG daily and Lactulose 15ML BID   Schedule EGD      Last visit CT aprn 4-26-23. No FU scheduled. Egd scheduled with  5-9-23 at [facility]. Patient called today from [phone] specifically asking to speak directly with nurse TONI carrion, I will forward as the patient requested.   murali

## 2023-05-09 ENCOUNTER — ANESTHESIA EVENT (OUTPATIENT)
Dept: OPERATING ROOM | Age: 88
End: 2023-05-09

## 2023-05-09 ENCOUNTER — ANESTHESIA (OUTPATIENT)
Dept: OPERATING ROOM | Age: 88
End: 2023-05-09

## 2023-05-09 ENCOUNTER — APPOINTMENT (OUTPATIENT)
Dept: OPERATING ROOM | Age: 88
End: 2023-05-09

## 2023-05-09 ENCOUNTER — HOSPITAL ENCOUNTER (OUTPATIENT)
Age: 88
Setting detail: OUTPATIENT SURGERY
Discharge: HOME | End: 2023-05-09
Attending: INTERNAL MEDICINE | Admitting: INTERNAL MEDICINE
Payer: MEDICARE

## 2023-05-09 ENCOUNTER — HOSPITAL ENCOUNTER (OUTPATIENT)
Age: 88
Setting detail: SPECIMEN
Discharge: HOME | End: 2023-05-09
Payer: MEDICARE

## 2023-05-09 VITALS
TEMPERATURE: 97.8 F | RESPIRATION RATE: 16 BRPM | HEIGHT: 63 IN | OXYGEN SATURATION: 94 % | HEART RATE: 75 BPM | DIASTOLIC BLOOD PRESSURE: 84 MMHG | BODY MASS INDEX: 34.2 KG/M2 | SYSTOLIC BLOOD PRESSURE: 150 MMHG | WEIGHT: 193 LBS

## 2023-05-09 PROCEDURE — 43450 DILATE ESOPHAGUS 1/MULT PASS: CPT

## 2023-05-09 PROCEDURE — 43239 EGD BIOPSY SINGLE/MULTIPLE: CPT | Performed by: INTERNAL MEDICINE

## 2023-05-09 PROCEDURE — G8918 PT W/O PREOP ORDER IV AB PRO: HCPCS

## 2023-05-09 PROCEDURE — G8907 PT DOC NO EVENTS ON DISCHARG: HCPCS

## 2023-05-09 PROCEDURE — 43450 DILATE ESOPHAGUS 1/MULT PASS: CPT | Performed by: INTERNAL MEDICINE

## 2023-05-09 PROCEDURE — 43239 EGD BIOPSY SINGLE/MULTIPLE: CPT

## 2023-05-09 PROCEDURE — 88305 TISSUE EXAM BY PATHOLOGIST: CPT

## 2023-05-09 RX ORDER — PROPOFOL 10 MG/ML
INJECTION, EMULSION INTRAVENOUS PRN
Status: DISCONTINUED | OUTPATIENT
Start: 2023-05-09 | End: 2023-05-09 | Stop reason: SDUPTHER

## 2023-05-09 RX ORDER — SODIUM CHLORIDE, SODIUM LACTATE, POTASSIUM CHLORIDE, CALCIUM CHLORIDE 600; 310; 30; 20 MG/100ML; MG/100ML; MG/100ML; MG/100ML
INJECTION, SOLUTION INTRAVENOUS CONTINUOUS
Status: DISCONTINUED | OUTPATIENT
Start: 2023-05-09 | End: 2023-05-09 | Stop reason: HOSPADM

## 2023-05-09 RX ORDER — SODIUM CHLORIDE, SODIUM LACTATE, POTASSIUM CHLORIDE, CALCIUM CHLORIDE 600; 310; 30; 20 MG/100ML; MG/100ML; MG/100ML; MG/100ML
INJECTION, SOLUTION INTRAVENOUS CONTINUOUS PRN
Status: DISCONTINUED | OUTPATIENT
Start: 2023-05-09 | End: 2023-05-09 | Stop reason: SDUPTHER

## 2023-05-09 RX ADMIN — SODIUM CHLORIDE, SODIUM LACTATE, POTASSIUM CHLORIDE, CALCIUM CHLORIDE: 600; 310; 30; 20 INJECTION, SOLUTION INTRAVENOUS at 12:44

## 2023-05-09 RX ADMIN — PROPOFOL 138 MG: 10 INJECTION, EMULSION INTRAVENOUS at 13:33

## 2023-05-09 RX ADMIN — SODIUM CHLORIDE, SODIUM LACTATE, POTASSIUM CHLORIDE, CALCIUM CHLORIDE: 600; 310; 30; 20 INJECTION, SOLUTION INTRAVENOUS at 13:28

## 2023-05-09 ASSESSMENT — LIFESTYLE VARIABLES: SMOKING_STATUS: 0

## 2023-05-09 ASSESSMENT — PAIN - FUNCTIONAL ASSESSMENT: PAIN_FUNCTIONAL_ASSESSMENT: 0-10

## 2023-05-09 NOTE — ANESTHESIA POSTPROCEDURE EVALUATION
Department of Anesthesiology  Postprocedure Note    Patient: Lorna Stiles  MRN: [id number]  YOB: 1935  Date of evaluation: 5/9/2023      Procedure Summary     Date: 05/09/23 Room / Location: [facility] ENDO 02 / [address]    Anesthesia Start: 6917 Anesthesia Stop: 7316    Procedures:       EGD BIOPSY (Esophagus)      EGD DILATION GREENWOOD 54FR Diagnosis:       Dysphagia, unspecified type      Hoarseness      (Dysphagia, unspecified type [R13.10])      (Hoarseness [R49.0])    Surgeons: Paul Morgan MD Responsible Provider: SUE Riggins CRNA    Anesthesia Type: general, TIVA ASA Status: 3          Anesthesia Type: No value filed.     Clarita Phase I:      Clarita Phase II:        Anesthesia Post Evaluation    Patient location during evaluation: bedside  Patient participation: complete - patient participated  Level of consciousness: sleepy but conscious  Pain score: 0  Airway patency: patent  Nausea & Vomiting: no nausea and no vomiting  Complications: no  Cardiovascular status: hemodynamically stable and blood pressure returned to baseline  Respiratory status: acceptable, spontaneous ventilation, nonlabored ventilation and room air  Hydration status: stable

## 2023-05-09 NOTE — DISCHARGE INSTRUCTIONS
POST-OP ORDERS: ENDOSCOPY & COLONOSCOPY:    1. Rest today. 2. DO NOT eat or drink until wide awake; eat your usual diet today in moderate amount only. 3. DO NOT drive today. 4. Call physician if you have severe pain, vomiting, fever, rectal bleeding or black bowel movements. 5.  If a biopsy was taken or a polyp removed, you should expect to hear results in about 21 days. If you have heard nothing from your physician by then, call the office for results. 6.  Discharge home when patient awake, vitals signs stable and tolerating liquids. NSAIDS Non-steroidal Anti-Inflammatory  You have been directed by your physician to avoid any NSAID's; the following medications are a list of those to avoid. If you think that you are taking any NSAID's notify your physician. Over The Counter  Advil                      Motrin  Nuprin                   Ibuprofen  Midol                     Aleve  Naproxen              Orudis  Aspirin                   Brie-Newton  Prescriptions and Generics  Cataflam              Relafen  Voltaren               Clinoril  Indocin                 Naproxen  Arthrotec              Lodine  Daypro                 Nalfon  Toradol                Ansaid  Feldene               Meclofenamate  Fenoprofen          Ponstel  Mobic                   Celebrex  Vioxx     1. Await path results, the patient will be contacted in 7-10 days with biopsy results. 2.  Magic mouthwash 5 ml PO Swish and swallow q3h PRN ONLY IF patient has post-procedural sorethroat or chest pain. 3. Full liquids to soft diet today jean-[facility]re [facility] the [facility]; may advance  diet starting in AM tomorrow. 4. May resume other meds except any ASA/NSAIDs; may use cough drops or lozenges PRN; also PPI or H2RA PO qday or BID with anti-GERD measures. 5. NO ASA/NSAIDs x 2 weeks  6. OP f/u in 6-8 weeks with Ms. Purdy Ashwin. ; will consider an Esophageal manometry later if the patient's dysphagia persists.

## 2023-05-09 NOTE — ANESTHESIA PRE PROCEDURE
Department of Anesthesiology  Preprocedure Note       Name:  Vani Douglass   Age:  80 y.o.  :  1935                                          MRN:  [id number]         Date:  2023      Surgeon: Isabel Villegas):  Jessica Michelle MD    Procedure: Procedure(s):  EGD BIOPSY    Medications prior to admission:   Prior to Admission medications    Medication Sig Start Date End Date Taking? Authorizing Provider   oxybutynin (DITROPAN) 5 MG tablet Take 1 tablet by mouth 2 times daily 3/21/23   Historical Provider, MD   linaclotide [facility]) 290 MCG CAPS capsule Take 1 capsule by mouth every morning (before breakfast) 23   SUE Valverde   lactulose (CHRONULAC) 10 GM/15ML solution Take 15 mLs by mouth 2 times daily Take 15 mLs by mouth 2 times daily 23   SUE Valverde   insulin detemir (LEVEMIR) 100 UNIT/ML injection vial Inject 15 Units into the skin nightly    Historical Provider, MD   atorvastatin (LIPITOR) 10 MG tablet Take 1 tablet by mouth daily    Historical Provider, MD   famotidine (PEPCID) 40 MG tablet Take 1 tablet by mouth 2 times daily    Historical Provider, MD   Ascorbic Acid (VITAMIN C) 250 MG tablet Take 1 tablet by mouth daily    Historical Provider, MD   aspirin 81 MG EC tablet Take 1 tablet by mouth daily    Historical Provider, MD   calcium carbonate-vitamin D (CALTRATE) 600-400 MG-UNIT TABS per tab Take 1 tablet by mouth 2 times daily    Historical Provider, MD   Cholecalciferol (VITAMIN D3) 125 MCG (5000 UT) CAPS Take 1 capsule by mouth daily    Historical Provider, MD   furosemide (LASIX) 20 MG tablet Take 1 tablet by mouth daily    Historical Provider, MD   gabapentin (NEURONTIN) 600 MG tablet Take 1 tablet by mouth 3 times daily. Historical Provider, MD   HYDROcodone-acetaminophen (NORCO) 5-325 MG per tablet Take 1 tablet by mouth every 6 hours as needed for Pain.     Historical Provider, MD   latanoprost (XALATAN) 0.005 % ophthalmic solution Place 1

## 2023-05-09 NOTE — H&P
Tobacco Use    Smoking status: Former     Types: Cigarettes     Quit date: 1987     Years since quittin.3    Smokeless tobacco: Never   Vaping Use    Vaping Use: Never used   Substance Use Topics    Alcohol use: Not Currently    Drug use: Not Currently       Vital Signs:   Vitals:    23 1148   BP: (!) 160/67   Pulse: 71   Resp: 16   Temp: 97 °F (36.1 °C)   SpO2: 94%        Physical Exam:  Cardiac:  [x]WNL  []Comments:  Pulmonary:  [x]WNL   []Comments:  Neuro/Mental Status:  [x]WNL  []Comments:  Abdominal:  [x]WNL    []Comments:  Other:   []WNL  []Comments:    Informed Consent:  The risks and benefits of the procedure have been discussed with either the patient or if they cannot consent, their representative. Assessment:  Patient examined and appropriate for planned sedation and procedure. Plan:  Proceed with planned sedation and procedure as above.          Terry Payne MD

## 2023-05-09 NOTE — OP NOTE
Endoscopic Procedure Note    Patient: Lance Riggins: 1935  Med Rec#: [id number] Acc#: [id number]     Primary Care Provider Neha Felder MD    Endoscopist: Carmelita Haas MD, MD    Date of Procedure:  5/9/2023    Procedure:      EGD with   a Patten bougie dilation of esophagus and  Cold biopsies    Indications:        1. Dysphagia, unspecified type  2. Chronic constipation  3. Barium esophagogram done in [facility] previously was reportedly normal  4. Family HX:  Brother had colon cancer, paternal uncle had colon cancer    She takes Linzess 290 MCG daily and lactulose 15ML BID; This controls her constipation quite well    Anesthesia:  Sedation was administered by anesthesia who monitored the patient during the procedure. Estimated Blood Loss: minimal    Procedure:   After reviewing the patient's chart and obtaining informed consent, the patient was placed in the left lateral decubitus position. A forward-viewing Olympus endoscope was lubricated and inserted through the mouth into the oropharynx. Under direct visualization, the upper esophagus was intubated. The scope was advanced to the level of the third portion of duodenum. Scope was slowly withdrawn with careful inspection of the mucosal surfaces. The scope was retroflexed for inspection of the gastric fundus and incisura. Findings and maneuvers are listed in impression below. Next, a lubricated Patten weighted Bougie dilator-54 Fr was gently introduced into the patient's mouth and passed into the Esophagus and into the proximal stomach without much resistance and then withdrawn. Repeat EGD was performed to verify dilation and scope tip was passed into the stomach. NO evidence of perforation or excessive bleeding was noted subsequent to the dilation. The patient tolerated the procedure well. The scope was removed. There were no immediate complications.     Findings/IMPRESSION:  Esophagus: abnormal: Normal

## 2023-06-29 ENCOUNTER — TELEPHONE (OUTPATIENT)
Dept: GASTROENTEROLOGY | Age: 88
End: 2023-06-29

## 2023-06-29 ENCOUNTER — OFFICE VISIT (OUTPATIENT)
Dept: GASTROENTEROLOGY | Age: 88
End: 2023-06-29
Payer: MEDICARE

## 2023-06-29 VITALS
HEART RATE: 83 BPM | SYSTOLIC BLOOD PRESSURE: 139 MMHG | BODY MASS INDEX: 34.2 KG/M2 | HEIGHT: 63 IN | WEIGHT: 193 LBS | OXYGEN SATURATION: 96 % | DIASTOLIC BLOOD PRESSURE: 83 MMHG

## 2023-06-29 DIAGNOSIS — R13.10 DYSPHAGIA, UNSPECIFIED TYPE: Primary | ICD-10-CM

## 2023-06-29 PROCEDURE — G8417 CALC BMI ABV UP PARAM F/U: HCPCS | Performed by: NURSE PRACTITIONER

## 2023-06-29 PROCEDURE — 1123F ACP DISCUSS/DSCN MKR DOCD: CPT | Performed by: NURSE PRACTITIONER

## 2023-06-29 PROCEDURE — 1090F PRES/ABSN URINE INCON ASSESS: CPT | Performed by: NURSE PRACTITIONER

## 2023-06-29 PROCEDURE — 99214 OFFICE O/P EST MOD 30 MIN: CPT | Performed by: NURSE PRACTITIONER

## 2023-06-29 PROCEDURE — 1036F TOBACCO NON-USER: CPT | Performed by: NURSE PRACTITIONER

## 2023-06-29 PROCEDURE — G8427 DOCREV CUR MEDS BY ELIG CLIN: HCPCS | Performed by: NURSE PRACTITIONER

## 2023-06-29 ASSESSMENT — ENCOUNTER SYMPTOMS
COUGH: 0
ABDOMINAL DISTENTION: 0
DIARRHEA: 0
NAUSEA: 0
ANAL BLEEDING: 0
CHOKING: 0
SHORTNESS OF BREATH: 0
VOMITING: 0
ABDOMINAL PAIN: 0
CONSTIPATION: 1
TROUBLE SWALLOWING: 1
RECTAL PAIN: 0
BLOOD IN STOOL: 0

## 2023-09-22 ENCOUNTER — OFFICE VISIT (OUTPATIENT)
Dept: GASTROENTEROLOGY | Age: 88
End: 2023-09-22
Payer: MEDICARE

## 2023-09-22 VITALS
HEART RATE: 88 BPM | SYSTOLIC BLOOD PRESSURE: 138 MMHG | HEIGHT: 63 IN | DIASTOLIC BLOOD PRESSURE: 80 MMHG | BODY MASS INDEX: 33.84 KG/M2 | WEIGHT: 191 LBS | OXYGEN SATURATION: 97 %

## 2023-09-22 DIAGNOSIS — R13.10 DYSPHAGIA, UNSPECIFIED TYPE: Primary | ICD-10-CM

## 2023-09-22 PROCEDURE — G8427 DOCREV CUR MEDS BY ELIG CLIN: HCPCS | Performed by: NURSE PRACTITIONER

## 2023-09-22 PROCEDURE — 99213 OFFICE O/P EST LOW 20 MIN: CPT | Performed by: NURSE PRACTITIONER

## 2023-09-22 PROCEDURE — G8417 CALC BMI ABV UP PARAM F/U: HCPCS | Performed by: NURSE PRACTITIONER

## 2023-09-22 PROCEDURE — 1036F TOBACCO NON-USER: CPT | Performed by: NURSE PRACTITIONER

## 2023-09-22 PROCEDURE — 1090F PRES/ABSN URINE INCON ASSESS: CPT | Performed by: NURSE PRACTITIONER

## 2023-09-22 PROCEDURE — 1123F ACP DISCUSS/DSCN MKR DOCD: CPT | Performed by: NURSE PRACTITIONER

## 2023-09-22 RX ORDER — LEVOTHYROXINE SODIUM 50 MCG
TABLET ORAL
COMMUNITY
Start: 2023-09-08

## 2023-09-22 RX ORDER — PANTOPRAZOLE SODIUM 40 MG/1
40 TABLET, DELAYED RELEASE ORAL DAILY
COMMUNITY
Start: 2023-08-29

## 2023-09-22 RX ORDER — LACTULOSE 10 G/15ML
10 SOLUTION ORAL 2 TIMES DAILY
Qty: 900 ML | Refills: 6 | Status: SHIPPED | OUTPATIENT
Start: 2023-09-22

## 2023-09-22 NOTE — PROGRESS NOTES
General: Normal range of motion. Cervical back: Normal range of motion and neck supple. Skin:     General: Skin is warm and dry. Coloration: Skin is not pale. Neurological:      Mental Status: She is alert and oriented to person, place, and time.    Psychiatric:         Behavior: Behavior normal.

## 2023-09-27 ASSESSMENT — ENCOUNTER SYMPTOMS
ABDOMINAL PAIN: 0
NAUSEA: 0
BLOOD IN STOOL: 0
CONSTIPATION: 0
TROUBLE SWALLOWING: 1
COUGH: 0
ABDOMINAL DISTENTION: 0
SHORTNESS OF BREATH: 0
ANAL BLEEDING: 0
RECTAL PAIN: 0
DIARRHEA: 0
VOMITING: 0
CHOKING: 0

## 2023-10-20 ENCOUNTER — TELEPHONE (OUTPATIENT)
Dept: GASTROENTEROLOGY | Age: 88
End: 2023-10-20

## 2023-10-20 NOTE — TELEPHONE ENCOUNTER
10-20-23  @ 3:15 pm    Patient called from [phone] said she needs to speak with [facility], said Bibiana Hunt is the only one who can help her with what she needs at present, no other information was left. Patient said [facility] helps her all the time and that is who she needs at present. As requested I will forward to [facility] to call this patient back.  [facility]

## 2023-10-20 NOTE — TELEPHONE ENCOUNTER
REACHED BACK OUT TO PT ABOUT HER PT ASSIST FORM. SHE WILL FAX IT TO ME ONCE SHE GETS ALL HER STUFF TOGETHER.

## 2023-11-06 ENCOUNTER — TELEPHONE (OUTPATIENT)
Dept: GASTROENTEROLOGY | Age: 88
End: 2023-11-06

## 2023-11-06 NOTE — TELEPHONE ENCOUNTER
11-6-23  CALLED JOHANNY, [phone], S/W KIT, PAPER WORK HAS NOT BEEN RECEIVED OR UPLOADED WHERE HE COULD SEE THEM. HE SAID TO CHECK BACK AT THE END OF THE WK. SHOULD BE UPLOADED BY THEN.

## 2023-12-06 ENCOUNTER — TELEPHONE (OUTPATIENT)
Dept: GASTROENTEROLOGY | Age: 88
End: 2023-12-06

## 2023-12-06 NOTE — TELEPHONE ENCOUNTER
12-06-23  Called Britni, [phone],  [facility], 2024 re-enrollment for Cindy Ainsleyderrick was approved on 11/21/23. Pt has been notified.

## 2024-06-04 ENCOUNTER — TELEPHONE (OUTPATIENT)
Dept: GASTROENTEROLOGY | Age: 89
End: 2024-06-04

## 2024-06-04 NOTE — TELEPHONE ENCOUNTER
06- Patient called and lvm that she needed to speak with Madhavi WATSON about her [employer] and the messages that she has been getting from them     Routed to Madhavi WATSON

## 2024-07-13 RX ORDER — LACTULOSE 10 G/15ML
SOLUTION ORAL
Qty: 948 ML | Refills: 0 | Status: SHIPPED | OUTPATIENT
Start: 2024-07-13

## 2024-08-12 RX ORDER — LACTULOSE 10 G/15ML
10 SOLUTION ORAL 2 TIMES DAILY
Qty: 948 ML | Refills: 3 | Status: SHIPPED | OUTPATIENT
Start: 2024-08-12

## 2024-09-24 ENCOUNTER — OFFICE VISIT (OUTPATIENT)
Dept: GASTROENTEROLOGY | Age: 89
End: 2024-09-24
Payer: MEDICARE

## 2024-09-24 VITALS
DIASTOLIC BLOOD PRESSURE: 80 MMHG | HEART RATE: 94 BPM | OXYGEN SATURATION: 94 % | SYSTOLIC BLOOD PRESSURE: 130 MMHG | BODY MASS INDEX: 35.44 KG/M2 | WEIGHT: 200 LBS | HEIGHT: 63 IN

## 2024-09-24 DIAGNOSIS — K59.00 CONSTIPATION, UNSPECIFIED CONSTIPATION TYPE: Primary | ICD-10-CM

## 2024-09-24 DIAGNOSIS — K59.09 CHRONIC CONSTIPATION: ICD-10-CM

## 2024-09-24 PROCEDURE — 1090F PRES/ABSN URINE INCON ASSESS: CPT | Performed by: NURSE PRACTITIONER

## 2024-09-24 PROCEDURE — 1123F ACP DISCUSS/DSCN MKR DOCD: CPT | Performed by: NURSE PRACTITIONER

## 2024-09-24 PROCEDURE — G8427 DOCREV CUR MEDS BY ELIG CLIN: HCPCS | Performed by: NURSE PRACTITIONER

## 2024-09-24 PROCEDURE — 1036F TOBACCO NON-USER: CPT | Performed by: NURSE PRACTITIONER

## 2024-09-24 PROCEDURE — 99214 OFFICE O/P EST MOD 30 MIN: CPT | Performed by: NURSE PRACTITIONER

## 2024-09-24 PROCEDURE — G8419 CALC BMI OUT NRM PARAM NOF/U: HCPCS | Performed by: NURSE PRACTITIONER

## 2024-09-24 RX ORDER — CYANOCOBALAMIN 1000 UG/ML
1000 INJECTION, SOLUTION INTRAMUSCULAR; SUBCUTANEOUS
COMMUNITY

## 2024-09-24 RX ORDER — FUROSEMIDE 20 MG
20 TABLET ORAL DAILY
COMMUNITY

## 2024-09-24 RX ORDER — OMEPRAZOLE 40 MG/1
40 CAPSULE, DELAYED RELEASE ORAL DAILY
Qty: 30 CAPSULE | Refills: 11 | Status: SHIPPED | OUTPATIENT
Start: 2024-09-24

## 2024-09-24 RX ORDER — TRIAMCINOLONE ACETONIDE 1 MG/G
OINTMENT TOPICAL 2 TIMES DAILY PRN
COMMUNITY

## 2024-09-24 RX ORDER — ALUMINUM HYDROXIDE, MAGNESIUM HYDROXIDE, SIMETHICONE 400; 400; 40 MG/10ML; MG/10ML; MG/10ML
5 SUSPENSION ORAL 3 TIMES DAILY
Qty: 769 ML | Refills: 3 | Status: SHIPPED | OUTPATIENT
Start: 2024-09-24

## 2024-09-24 RX ORDER — ALBUTEROL SULFATE 1.25 MG/3ML
1 SOLUTION RESPIRATORY (INHALATION) EVERY 6 HOURS PRN
COMMUNITY

## 2024-09-24 RX ORDER — INSULIN GLARGINE 100 [IU]/ML
25 INJECTION, SOLUTION SUBCUTANEOUS NIGHTLY
COMMUNITY

## 2024-09-24 RX ORDER — LEVOTHYROXINE SODIUM 25 UG/1
25 TABLET ORAL DAILY
COMMUNITY

## 2024-09-24 RX ORDER — LACTULOSE 10 G/15ML
10 SOLUTION ORAL 2 TIMES DAILY
Qty: 948 ML | Refills: 3 | Status: SHIPPED | OUTPATIENT
Start: 2024-09-24

## 2024-09-24 NOTE — PROGRESS NOTES
Subjective:     Patient ID: Laurel Sethi is a 89 y.o. female  PCP: Renny Pulido MD  Referring Provider: No ref. provider found    HPI  Patient presents to the office today with the following complaints: 1 Year Follow Up      Patient seen in the office today for annual follow up   She has chronic GERD and she is Omeprazole 40 mg daily, this works well for her she also uses Mylanta PRN  She denies and issues with swallowing at this time     She also has chronic constipation and is taking lactulose and this works well for her.      Assessment:     1. Constipation, unspecified constipation type  2. Chronic constipation       Review of Systems   Constitutional:  Negative for activity change, appetite change, fatigue, fever and unexpected weight change.   HENT:  Negative for trouble swallowing.    Respiratory:  Negative for cough, choking and shortness of breath.    Cardiovascular:  Negative for chest pain.   Gastrointestinal:  Negative for abdominal distention, abdominal pain, anal bleeding, blood in stool, constipation, diarrhea, nausea, rectal pain and vomiting.   Allergic/Immunologic: Negative for food allergies.   All other systems reviewed and are negative.      Plan:   Continue omeprazole, mylanta, and lactulose as prescribed RX's provided   Follow up in 1 year or sooner if needed   Orders  No orders of the defined types were placed in this encounter.    Medications  Orders Placed This Encounter   Medications    lactulose (CONSTULOSE) 10 GM/15ML solution     Sig: Take 15 mLs by mouth 2 times daily     Dispense:  948 mL     Refill:  3    omeprazole (PRILOSEC) 40 MG delayed release capsule     Sig: Take 1 capsule by mouth daily Take first thing daily on an empty stomach.     Dispense:  30 capsule     Refill:  11    Alum & Mag Hydroxide-Simeth (ALUMINUM-MAGNESIUM-SIMETHICONE) 200-200-20 MG/5ML SUSP     Sig: Take 5 mLs by mouth in the morning, at noon, and at bedtime     Dispense:  769 mL     Refill:  3

## 2024-10-01 ASSESSMENT — ENCOUNTER SYMPTOMS
SHORTNESS OF BREATH: 0
BLOOD IN STOOL: 0
TROUBLE SWALLOWING: 0
NAUSEA: 0
CONSTIPATION: 0
COUGH: 0
ANAL BLEEDING: 0
ABDOMINAL DISTENTION: 0
DIARRHEA: 0
VOMITING: 0
ABDOMINAL PAIN: 0
RECTAL PAIN: 0
CHOKING: 0

## 2024-10-10 ENCOUNTER — TELEPHONE (OUTPATIENT)
Dept: GASTROENTEROLOGY | Age: 89
End: 2024-10-10

## 2024-10-10 NOTE — TELEPHONE ENCOUNTER
Can you ask her if her bowels are moving well, we may need to increase her lactulose if she is not having at least 1 bowel movement per day

## 2024-10-10 NOTE — TELEPHONE ENCOUNTER
CessLaurel wanted me to let you know that the medication is helping with the burping & gas but not the bloating.    Will it help with the bloating longer she takes it?    Please advise, thanks  Madhavi Shah MA

## 2024-10-10 NOTE — TELEPHONE ENCOUNTER
10- Patient called and LVM that she was leaving a message to Madhavi WATSON and wanted her to return her call.     Routed to Madhavi WATSON

## 2024-10-17 DIAGNOSIS — K59.09 CHRONIC CONSTIPATION: ICD-10-CM

## 2024-10-17 NOTE — TELEPHONE ENCOUNTER
Cess,    Pt called Jerry for her refill and was told that we need to send in a new RX.    Pharmacy has been attached to [facility]

## 2024-11-12 ENCOUNTER — TELEPHONE (OUTPATIENT)
Dept: GASTROENTEROLOGY | Age: 89
End: 2024-11-12

## 2024-11-12 NOTE — TELEPHONE ENCOUNTER
11-12-24 2025 re-enrollment form faxed to [facility]  [phone] to patient assistance on [employer]    Fax confirmation scanned in media and attached to this encounter.

## 2024-11-12 NOTE — TELEPHONE ENCOUNTER
----- Message from WALTER GREEN MA sent at 12/6/2023 12:51 PM CST -----  Regarding: ENROLLMENT- TONY  2025 RE-ENROLLMENT- JOHANNY-DUE IN NOV

## 2024-11-13 NOTE — TELEPHONE ENCOUNTER
11- Patient called and requested that Madhavi WATSON return her call     Routed to Madhavi WATSON

## 2024-11-13 NOTE — TELEPHONE ENCOUNTER
Called Laurel and she told me that [employer] called her and said that they mailed a letter out yesterday and she will have to fill out the entire form for 2025.    Once she receives it she will call me and we will go over it.    I will need proof of income. So when I get all of this from her I will send it in.

## 2024-11-22 ENCOUNTER — TELEPHONE (OUTPATIENT)
Dept: GASTROENTEROLOGY | Age: 89
End: 2024-11-22

## 2024-11-22 NOTE — TELEPHONE ENCOUNTER
11- Pt called and lvm that she is needing Nurse Madhavi WATSON to return her call ASAP.     Routed to Madhavi WATSON

## 2024-11-22 NOTE — TELEPHONE ENCOUNTER
11- Patient called back stated that she was calling Madhavi Barnard will be by this afternoon to sign papers       Routed to Madhavi WATSON

## 2025-01-14 ENCOUNTER — TELEPHONE (OUTPATIENT)
Dept: GASTROENTEROLOGY | Age: 89
End: 2025-01-14

## 2025-01-14 NOTE — TELEPHONE ENCOUNTER
01- Patient called and stated that she was calling for Madhavi WATSON   She is out of her Linzess and has not heard from [facility].  She took her last pill this am.  Her  is coming to [place] some time today and wanted to see about getting samples.     Routed to Madhavi WATSON

## 2025-05-01 RX ORDER — LACTULOSE 10 G/15ML
SOLUTION ORAL
Qty: 948 ML | Refills: 0 | Status: SHIPPED | OUTPATIENT
Start: 2025-05-01

## 2025-06-05 RX ORDER — LACTULOSE 10 G/15ML
SOLUTION ORAL
Qty: 948 ML | Refills: 0 | Status: SHIPPED | OUTPATIENT
Start: 2025-06-05

## 2025-06-05 NOTE — TELEPHONE ENCOUNTER
06- Patient called [facility] that she is needing a refill on her Constulose     [facility] in [facility]       Routed to CT APRN

## 2025-07-08 RX ORDER — LACTULOSE 10 G/15ML
SOLUTION ORAL
Qty: 948 ML | Refills: 0 | Status: SHIPPED | OUTPATIENT
Start: 2025-07-08

## 2025-08-10 RX ORDER — LACTULOSE 10 G/15ML
SOLUTION ORAL
Qty: 948 ML | Refills: 0 | Status: SHIPPED | OUTPATIENT
Start: 2025-08-10 | End: 2025-08-10 | Stop reason: SDUPTHER

## 2025-08-10 RX ORDER — LACTULOSE 10 G/15ML
10 SOLUTION ORAL 2 TIMES DAILY
Qty: 948 ML | Refills: 11 | Status: SHIPPED | OUTPATIENT
Start: 2025-08-10

## 2025-08-20 ENCOUNTER — OFFICE VISIT (OUTPATIENT)
Dept: GASTROENTEROLOGY | Age: 89
End: 2025-08-20
Payer: MEDICARE

## 2025-08-20 VITALS
WEIGHT: 198 LBS | HEIGHT: 63 IN | OXYGEN SATURATION: 96 % | HEART RATE: 79 BPM | SYSTOLIC BLOOD PRESSURE: 155 MMHG | BODY MASS INDEX: 35.08 KG/M2 | DIASTOLIC BLOOD PRESSURE: 80 MMHG

## 2025-08-20 DIAGNOSIS — K59.09 CHRONIC CONSTIPATION: Primary | ICD-10-CM

## 2025-08-20 PROCEDURE — G8428 CUR MEDS NOT DOCUMENT: HCPCS | Performed by: NURSE PRACTITIONER

## 2025-08-20 PROCEDURE — 4004F PT TOBACCO SCREEN RCVD TLK: CPT | Performed by: NURSE PRACTITIONER

## 2025-08-20 PROCEDURE — G8417 CALC BMI ABV UP PARAM F/U: HCPCS | Performed by: NURSE PRACTITIONER

## 2025-08-20 PROCEDURE — 99213 OFFICE O/P EST LOW 20 MIN: CPT | Performed by: NURSE PRACTITIONER

## 2025-08-20 PROCEDURE — 1123F ACP DISCUSS/DSCN MKR DOCD: CPT | Performed by: NURSE PRACTITIONER

## 2025-08-20 PROCEDURE — 1090F PRES/ABSN URINE INCON ASSESS: CPT | Performed by: NURSE PRACTITIONER

## 2025-08-26 ASSESSMENT — ENCOUNTER SYMPTOMS
VOMITING: 0
SHORTNESS OF BREATH: 0
DIARRHEA: 0
BLOOD IN STOOL: 0
NAUSEA: 0
ABDOMINAL PAIN: 0
COUGH: 0
CONSTIPATION: 1
TROUBLE SWALLOWING: 0
CHOKING: 0
ANAL BLEEDING: 0
ABDOMINAL DISTENTION: 0
RECTAL PAIN: 0

## (undated) DEVICE — SINGLE PORT MANIFOLD: Brand: NEPTUNE 2

## (undated) DEVICE — BITE BLOCK ENDOSCP AD 60 FR W/ ADJ STRP PLAS GRN BLOX

## (undated) DEVICE — CANNULA NSL AD L7FT DIV O2 CO2 W/ M LUERLOCK TRMPT CONN

## (undated) DEVICE — ENDO KIT,LOURDES HOSPITAL: Brand: MEDLINE INDUSTRIES, INC.

## (undated) DEVICE — FORCEPS BX L240CM JAW DIA2.4MM ORNG L CAP W/ NDL DISP RAD

## (undated) DEVICE — ENDO KIT: Brand: MEDLINE INDUSTRIES, INC.

## (undated) DEVICE — FORCEPS BX 240CM 2.4MM L NDL RAD JAW 4 M00513334